# Patient Record
Sex: FEMALE | Race: AMERICAN INDIAN OR ALASKA NATIVE
[De-identification: names, ages, dates, MRNs, and addresses within clinical notes are randomized per-mention and may not be internally consistent; named-entity substitution may affect disease eponyms.]

---

## 2018-02-07 ENCOUNTER — HOSPITAL ENCOUNTER (OUTPATIENT)
Dept: HOSPITAL 5 - MRI | Age: 63
Discharge: HOME | End: 2018-02-07
Attending: PAIN MEDICINE
Payer: MEDICARE

## 2018-02-07 DIAGNOSIS — M51.46: ICD-10-CM

## 2018-02-07 DIAGNOSIS — M24.28: ICD-10-CM

## 2018-02-07 DIAGNOSIS — M48.07: Primary | ICD-10-CM

## 2018-02-07 PROCEDURE — 72148 MRI LUMBAR SPINE W/O DYE: CPT

## 2018-02-10 NOTE — MAGNETIC RESONANCE REPORT
MRI LUMBAR SPINE WITHOUT CONTRAST: 02/07/18



CLINICAL: Low back pain.





TECHNIQUE: Sagittal and axial T1 and T2, and sagittal STIR sequences a 

1.5 Krista magnet.  



FINDINGS: Normal vertebral body height, alignment and disc spaces.  

Overall marrow signal is normal.  The conus medullaris is normal and 

terminates at T12-L1.  There is pronounced fatty infiltration of the 

paraspinous muscles.



L1-2: Intact.



L2-3: Intact disc.  However, a large Schmorl's node of the superior 

endplate of L3.  Moderate ligament flavum hypertrophy and mild 

bilateral facet hypertrophy.



L3-4: Intact disc.  Moderate bilateral facet hypertrophy and ligamentum 

flavum hypertrophy.



L4-5: A focal central annular tear and a moderate size central focal 

disc protrusion.  Bilateral facet hypertrophy and ligamentum flavum 

hypertrophy contribute to moderate central spinal canal stenosis.  The 

AP diameter of the canal measures 6 mm.  Moderately severe bilateral 

neural foraminal stenosis.



L5-S1: Small broad-based central disc protrusion.  No canal stenosis.  

Bilateral facet hypertrophy and mild left neural foraminal stenosis.



IMPRESSION: 1.  Moderate size L4-5 focal central disc protrusion which 

contributes to moderate central spinal canal stenosis.  2.  Moderate to 

severe bilateral neural foraminal stenosis at L4-5.  3.  Small 

broad-based L4-5 central disc protrusion and mild left L5-S1 neural 

foraminal stenosis.

## 2021-05-23 ENCOUNTER — HOSPITAL ENCOUNTER (INPATIENT)
Dept: HOSPITAL 5 - ED | Age: 66
LOS: 6 days | Discharge: HOME HEALTH | DRG: 871 | End: 2021-05-29
Attending: INTERNAL MEDICINE | Admitting: INTERNAL MEDICINE
Payer: MEDICARE

## 2021-05-23 DIAGNOSIS — Z88.8: ICD-10-CM

## 2021-05-23 DIAGNOSIS — E11.42: ICD-10-CM

## 2021-05-23 DIAGNOSIS — J96.01: ICD-10-CM

## 2021-05-23 DIAGNOSIS — Z71.3: ICD-10-CM

## 2021-05-23 DIAGNOSIS — J18.9: ICD-10-CM

## 2021-05-23 DIAGNOSIS — I12.0: ICD-10-CM

## 2021-05-23 DIAGNOSIS — E87.5: ICD-10-CM

## 2021-05-23 DIAGNOSIS — R16.0: ICD-10-CM

## 2021-05-23 DIAGNOSIS — E11.51: ICD-10-CM

## 2021-05-23 DIAGNOSIS — I48.91: ICD-10-CM

## 2021-05-23 DIAGNOSIS — G92: ICD-10-CM

## 2021-05-23 DIAGNOSIS — Z79.4: ICD-10-CM

## 2021-05-23 DIAGNOSIS — Z99.2: ICD-10-CM

## 2021-05-23 DIAGNOSIS — E66.01: ICD-10-CM

## 2021-05-23 DIAGNOSIS — A41.89: Primary | ICD-10-CM

## 2021-05-23 DIAGNOSIS — Z20.822: ICD-10-CM

## 2021-05-23 DIAGNOSIS — N18.6: ICD-10-CM

## 2021-05-23 DIAGNOSIS — Z82.49: ICD-10-CM

## 2021-05-23 DIAGNOSIS — E11.22: ICD-10-CM

## 2021-05-23 LAB
ALBUMIN SERPL-MCNC: 4 G/DL (ref 3.9–5)
ALT SERPL-CCNC: 8 UNITS/L (ref 7–56)
APTT BLD: 33.7 SEC. (ref 24.2–36.6)
BASOPHILS # (AUTO): 0 K/MM3 (ref 0–0.1)
BASOPHILS NFR BLD AUTO: 0.4 % (ref 0–1.8)
BUN SERPL-MCNC: 49 MG/DL (ref 7–17)
BUN/CREAT SERPL: 6 %
CALCIUM SERPL-MCNC: 8.6 MG/DL (ref 8.4–10.2)
CRP SERPL-MCNC: 5 MG/DL (ref 0–1.3)
EOSINOPHIL # BLD AUTO: 0.2 K/MM3 (ref 0–0.4)
EOSINOPHIL NFR BLD AUTO: 1.6 % (ref 0–4.3)
HCT VFR BLD CALC: 37.9 % (ref 30.3–42.9)
HDLC SERPL-MCNC: 60 MG/DL (ref 40–59)
HEMOLYSIS INDEX: 3
HGB BLD-MCNC: 12.7 GM/DL (ref 10.1–14.3)
INR PPP: 1.32 (ref 0.87–1.13)
LYMPHOCYTES # BLD AUTO: 0.2 K/MM3 (ref 1.2–5.4)
LYMPHOCYTES NFR BLD AUTO: 1.8 % (ref 13.4–35)
MCHC RBC AUTO-ENTMCNC: 34 % (ref 30–34)
MCV RBC AUTO: 91 FL (ref 79–97)
MONOCYTES # (AUTO): 0.8 K/MM3 (ref 0–0.8)
MONOCYTES % (AUTO): 8.1 % (ref 0–7.3)
PLATELET # BLD: 119 K/MM3 (ref 140–440)
RBC # BLD AUTO: 4.15 M/MM3 (ref 3.65–5.03)

## 2021-05-23 PROCEDURE — 96375 TX/PRO/DX INJ NEW DRUG ADDON: CPT

## 2021-05-23 PROCEDURE — 84145 PROCALCITONIN (PCT): CPT

## 2021-05-23 PROCEDURE — U0003 INFECTIOUS AGENT DETECTION BY NUCLEIC ACID (DNA OR RNA); SEVERE ACUTE RESPIRATORY SYNDROME CORONAVIRUS 2 (SARS-COV-2) (CORONAVIRUS DISEASE [COVID-19]), AMPLIFIED PROBE TECHNIQUE, MAKING USE OF HIGH THROUGHPUT TECHNOLOGIES AS DESCRIBED BY CMS-2020-01-R: HCPCS

## 2021-05-23 PROCEDURE — 71250 CT THORAX DX C-: CPT

## 2021-05-23 PROCEDURE — 85379 FIBRIN DEGRADATION QUANT: CPT

## 2021-05-23 PROCEDURE — 96374 THER/PROPH/DIAG INJ IV PUSH: CPT

## 2021-05-23 PROCEDURE — 71045 X-RAY EXAM CHEST 1 VIEW: CPT

## 2021-05-23 PROCEDURE — 93005 ELECTROCARDIOGRAM TRACING: CPT

## 2021-05-23 PROCEDURE — G0480 DRUG TEST DEF 1-7 CLASSES: HCPCS

## 2021-05-23 PROCEDURE — 84100 ASSAY OF PHOSPHORUS: CPT

## 2021-05-23 PROCEDURE — 86140 C-REACTIVE PROTEIN: CPT

## 2021-05-23 PROCEDURE — 85730 THROMBOPLASTIN TIME PARTIAL: CPT

## 2021-05-23 PROCEDURE — 83615 LACTATE (LD) (LDH) ENZYME: CPT

## 2021-05-23 PROCEDURE — 84484 ASSAY OF TROPONIN QUANT: CPT

## 2021-05-23 PROCEDURE — 93306 TTE W/DOPPLER COMPLETE: CPT

## 2021-05-23 PROCEDURE — 86850 RBC ANTIBODY SCREEN: CPT

## 2021-05-23 PROCEDURE — 85025 COMPLETE CBC W/AUTO DIFF WBC: CPT

## 2021-05-23 PROCEDURE — 83735 ASSAY OF MAGNESIUM: CPT

## 2021-05-23 PROCEDURE — 85610 PROTHROMBIN TIME: CPT

## 2021-05-23 PROCEDURE — 80074 ACUTE HEPATITIS PANEL: CPT

## 2021-05-23 PROCEDURE — 82140 ASSAY OF AMMONIA: CPT

## 2021-05-23 PROCEDURE — 80061 LIPID PANEL: CPT

## 2021-05-23 PROCEDURE — 74176 CT ABD & PELVIS W/O CONTRAST: CPT

## 2021-05-23 PROCEDURE — 82728 ASSAY OF FERRITIN: CPT

## 2021-05-23 PROCEDURE — 70450 CT HEAD/BRAIN W/O DYE: CPT

## 2021-05-23 PROCEDURE — 84443 ASSAY THYROID STIM HORMONE: CPT

## 2021-05-23 PROCEDURE — 80053 COMPREHEN METABOLIC PANEL: CPT

## 2021-05-23 PROCEDURE — 80048 BASIC METABOLIC PNL TOTAL CA: CPT

## 2021-05-23 PROCEDURE — 5A1D70Z PERFORMANCE OF URINARY FILTRATION, INTERMITTENT, LESS THAN 6 HOURS PER DAY: ICD-10-PCS | Performed by: INTERNAL MEDICINE

## 2021-05-23 PROCEDURE — 74181 MRI ABDOMEN W/O CONTRAST: CPT

## 2021-05-23 PROCEDURE — 80320 DRUG SCREEN QUANTALCOHOLS: CPT

## 2021-05-23 PROCEDURE — 82962 GLUCOSE BLOOD TEST: CPT

## 2021-05-23 PROCEDURE — 36415 COLL VENOUS BLD VENIPUNCTURE: CPT

## 2021-05-23 PROCEDURE — 87040 BLOOD CULTURE FOR BACTERIA: CPT

## 2021-05-23 PROCEDURE — 86901 BLOOD TYPING SEROLOGIC RH(D): CPT

## 2021-05-23 PROCEDURE — 82550 ASSAY OF CK (CPK): CPT

## 2021-05-23 PROCEDURE — 80202 ASSAY OF VANCOMYCIN: CPT

## 2021-05-23 PROCEDURE — 86900 BLOOD TYPING SEROLOGIC ABO: CPT

## 2021-05-23 PROCEDURE — 83036 HEMOGLOBIN GLYCOSYLATED A1C: CPT

## 2021-05-23 NOTE — CAT SCAN REPORT
CT CHEST, ABDOMEN, AND PELVIS WITHOUT CONTRAST



INDICATION / CLINICAL INFORMATION:

sepsis, covid vs port infection vs other.



TECHNIQUE:

Axial CT images were obtained through the chest, abdomen, and pelvis without contrast. All CT scans a
t this location are performed using CT dose reduction for ALARA by means of automated exposure contro
l. 



COMPARISON:

None available.



FINDINGS:

HEART: No significant abnormality. 

THORACIC AORTA: No significant abnormality. 

MEDIASTINUM and ANNA: No significant abnormality.

LUNGS: There are mild patchy groundglass density changes throughout the lungs without focal airspace 
consolidation. No suspicious pulmonary nodule or mass is identified.

PLEURA: No significant pleural effusion. No pneumothorax.

ADDITIONAL CHEST FINDINGS: Left-sided port catheter is noted. No surrounding fluid collection. There 
are numerous prominent and enlarged supraclavicular lymph nodes bilaterally.



LIVER: There is a poorly defined hypoattenuating lesion within the right hepatic lobe near the dome. 
This measures approximately 6.6 x 5.4 cm.

GALLBLADDER: No significant abnormality.  

BILE DUCTS: No significant abnormality.

PANCREAS: No significant abnormality.

SPLEEN: No significant abnormality.

ADRENALS: No significant abnormality.

KIDNEYS/URETERS: Innumerable bilateral renal cysts are noted. There is no nephroureterolithiasis or o
bstructive uropathy.



STOMACH and SMALL BOWEL: No significant abnormality. 

COLON: No significant abnormality. 

APPENDIX: No significant abnormality.  

PERITONEUM: No free fluid. No free air. No fluid collection.

LYMPH NODES: No significant adenopathy.

AORTA and ARTERIES: Moderate atherosclerotic calcification without acute abnormality. 

IVC and VEINS: No significant abnormality.



URINARY BLADDER: No significant abnormality.

REPRODUCTIVE ORGANS: No significant abnormality.



ADDITIONAL FINDINGS: None.



SKELETAL SYSTEM: There is a rounded lytic lesion within the T12 vertebral body.



IMPRESSION:

1. Large hypoattenuating right hepatic lobe lesion is concerning for either metastatic disease or jason
mamadou neoplasm. Further evaluation with MRI or PET CT may be helpful for further evaluation.

2. Supraclavicular lymphadenopathy could also reflect sarah metastatic disease.

3. Polycystic kidneys.

4. Mild diffuse groundglass density changes throughout the lungs can be seen with atypical or viral i
nfectious processes.

5. The left-sided port catheter demonstrates no significant abnormality. There is no focal fluid cristian
ection/abscess.



Signer Name: Can Farias MD 

Signed: 5/23/2021 5:22 PM

Workstation Name: [id number]

## 2021-05-23 NOTE — CAT SCAN REPORT
CT CHEST, ABDOMEN, AND PELVIS WITHOUT CONTRAST



INDICATION / CLINICAL INFORMATION:

sepsis, covid vs port infection vs other.



TECHNIQUE:

Axial CT images were obtained through the chest, abdomen, and pelvis without contrast. All CT scans a
t this location are performed using CT dose reduction for ALARA by means of automated exposure contro
l. 



COMPARISON:

None available.



FINDINGS:

HEART: No significant abnormality. 

THORACIC AORTA: No significant abnormality. 

MEDIASTINUM and ANNA: No significant abnormality.

LUNGS: There are mild patchy groundglass density changes throughout the lungs without focal airspace 
consolidation. No suspicious pulmonary nodule or mass is identified.

PLEURA: No significant pleural effusion. No pneumothorax.

ADDITIONAL CHEST FINDINGS: Left-sided port catheter is noted. No surrounding fluid collection. There 
are numerous prominent and enlarged supraclavicular lymph nodes bilaterally.



LIVER: There is a poorly defined hypoattenuating lesion within the right hepatic lobe near the dome. 
This measures approximately 6.6 x 5.4 cm.

GALLBLADDER: No significant abnormality.  

BILE DUCTS: No significant abnormality.

PANCREAS: No significant abnormality.

SPLEEN: No significant abnormality.

ADRENALS: No significant abnormality.

KIDNEYS/URETERS: Innumerable bilateral renal cysts are noted. There is no nephroureterolithiasis or o
bstructive uropathy.



STOMACH and SMALL BOWEL: No significant abnormality. 

COLON: No significant abnormality. 

APPENDIX: No significant abnormality.  

PERITONEUM: No free fluid. No free air. No fluid collection.

LYMPH NODES: No significant adenopathy.

AORTA and ARTERIES: Moderate atherosclerotic calcification without acute abnormality. 

IVC and VEINS: No significant abnormality.



URINARY BLADDER: No significant abnormality.

REPRODUCTIVE ORGANS: No significant abnormality.



ADDITIONAL FINDINGS: None.



SKELETAL SYSTEM: There is a rounded lytic lesion within the T12 vertebral body.



IMPRESSION:

1. Large hypoattenuating right hepatic lobe lesion is concerning for either metastatic disease or jason
mamadou neoplasm. Further evaluation with MRI or PET CT may be helpful for further evaluation.

2. Supraclavicular lymphadenopathy could also reflect sarah metastatic disease.

3. Polycystic kidneys.

4. Mild diffuse groundglass density changes throughout the lungs can be seen with atypical or viral i
nfectious processes.

5. The left-sided port catheter demonstrates no significant abnormality. There is no focal fluid cristian
ection/abscess.



Signer Name: Can Farias MD 

Signed: 5/23/2021 5:22 PM

Workstation Name: [id number]

## 2021-05-23 NOTE — XRAY REPORT
CHEST 1 VIEW 5/23/2021 5:06 PM



INDICATION / CLINICAL INFORMATION: Altered Mental Status.



COMPARISON: None available.



FINDINGS:



SUPPORT DEVICES: Left-sided Port-A-Cath.

HEART / MEDIASTINUM: Borderline cardiomegaly. 

LUNGS / PLEURA: Central vascular congestion and mild interstitial prominence bilaterally. No pneumoth
orax. 



ADDITIONAL FINDINGS: No significant additional findings.



IMPRESSION:

1. Borderline cardiomegaly with central vascular congestion with mild interstitial prominence. This c
an be seen in the setting of congestive failure/pulmonary edema.



Signer Name: Can Farias MD 

Signed: 5/23/2021 6:17 PM

Workstation Name: [id number]

## 2021-05-23 NOTE — HISTORY AND PHYSICAL REPORT
History of Present Illness


Date of examination: 21


Date of admission: 


21 21:43





Chief complaint: 





Altered sensorium since last night


History of present illness: 


65-year-old female with history of hypertension, end-stage renal disease, 

insulin-dependent diabetes brought in by EMS for altered sensorium since last 

night. As per family patient has been lethargic and also having low-grade fever.

Patient go for dialysis on a regular basis. In the emergency room patient was 

lethargic and but easily arousable. Patient also had a low-grade fever in the 

emergency room. Temperature 100.3. In the emergency room patient was found to be

having a high potassium of 6.3 which was treated in the emergency room. Patient 

had CAT scans of the chest and abdomen which showed groundglass opacity and also

had hepatic mass. Patient being admitted for further evaluation and rule out 

Covid plus bilateral pneumonia and treatment of hyperkalemia.


Patient is a full code.

















Past History


Past Medical History: CAD, diabetes, ESRD, hypertension, other (Peripheral 

neuropathy)


Past Surgical History: Other (AV fistula" on the left side)


Social history: lives with family, full code


Family history: hypertension





Medications and Allergies


                                    Allergies











Allergy/AdvReac Type Severity Reaction Status Date / Time


 


sulfamethoxazole Allergy  Shortness Unverified 10/15/14 05:48





[From Bactrim]   of  





   Breath/HIVES  


 


trimethoprim [From Bactrim] Allergy  Shortness Unverified 10/15/14 05:48





   of  





   Breath/HIVES  











                                Home Medications











 Medication  Instructions  Recorded  Confirmed  Last Taken  Type


 


Apixaban [Eliquis] 5 mg PO BID 21 Unknown History


 


Cholecalciferol (Vitamin D3) 2,000 unit PO QDAY 21 Unknown 

History





[Vitamin D3 2,000 UNIT CAP]     


 


Cinacalcet HCl [Sensipar] 30 mg PO DAILY 21 Unknown History


 


Gabapentin [Neurontin] 600 mg PO Q8H 21 Unknown History


 


Insulin Aspart (Nf) [NovoLOG 1 unit SQ DAILY 21 Unknown History





Flexpen]     


 


Metoprolol [Lopressor] 25 mg PO DAILY 21 Unknown History


 


Pantoprazole Sodium [Protonix] 40 mg PO DAILY 21 Unknown History


 


Pregabalin [Lyrica] 75 mg PO BID 21 Unknown History


 


Sevelamer Carbonate [Renvela] 800 mg PO TIDWM PRN 21 Unknown 

History


 


Tadalafil [Cialis] 20 mg PO BID 21 Unknown History


 


Tramadol HCl [traMADol  MG] 1 tab PO DAILY 21 Unknown H

istory


 


dilTIAZem HCl [Diltiazem 24Hr  mg PO DAILY 21 Unknown 

History





(Cd)]     











Active Meds: 


Active Medications





Acetaminophen (Acetaminophen 325 Mg Tab)  650 mg PO Q4H PRN


   PRN Reason: Pain MILD(1-3)/Fever >100.5/HA


Acetaminophen (Acetaminophen 325 Mg Tab)  650 mg PO Q4H PRN


   PRN Reason: Pain MILD(1-3)/Fever >100.5/HA


Apixaban (Apixaban 5 Mg Tab)  5 mg PO BID JUDY


Cholecalciferol (Cholecalciferol (Vit D3) 1000 Unit (25 Mcg) Tab)  2,000 unit PO

QDAY JUDY


Cinacalcet (Cinacalcet 30 Mg Tab)  30 mg PO DAILY JUDY


Dextrose (Dextrose 50% In Water (25gm) 50 Ml Vial)  50 gm IV Q30MIN PRN; 

Protocol


   PRN Reason: Hypoglycemia


   Last Admin: 21 19:30 Dose:  50 gm


   Documented by: 


Diltiazem HCl (Diltiazem Cd 180 Mg Cap)  180 mg PO DAILY Replaced by [facility]


Gabapentin (Gabapentin 300 Mg Cap)  600 mg PO Q8HR JUDY


Cefepime HCl (Cefepime/Ns 1 Gm/100 Ml)  1 gm in 100 mls @ 200 mls/hr IV Q8H JUDY;

Protocol


Calcium Gluconate 2,000 mg/ (Sodium Chloride)  120 mls @ 660 mls/hr IV ONCE ONE


   Stop: 21 22:49


Insulin Human Lispro (Insulin Lispro 100 Unit/Ml)  0 unit SUB-Q ACHS JUDY; 

Protocol


Metoclopramide HCl (Metoclopramide 10 Mg/2 Ml Inj)  10 mg IV Q6H PRN


   PRN Reason: Nausea And Vomiting


Metoprolol Tartrate (Metoprolol Tartrate 25 Mg Tab)  25 mg PO DAILY Replaced by [facility]


Miscellaneous Medication (Tadalafil [Cialis])  20 mg PO BID Replaced by [facility]


Morphine Sulfate (Morphine 2 Mg/1 Ml Inj)  2 mg IV Q4H PRN


   PRN Reason: Pain, Moderate (4-6)


Ondansetron HCl (Ondansetron 4 Mg/2 Ml Inj)  4 mg IV Q8H PRN


   PRN Reason: Nausea And Vomiting


Oxycodone/Acetaminophen (Oxycodone /Acetaminophen 5-325mg Tab)  1 tab PO Q6H PRN


   PRN Reason: Pain, Moderate (4-6)


Pantoprazole Sodium (Pantoprazole 40 Mg Tab)  40 mg PO DAILY Replaced by [facility]


Pregabalin (Pregabalin 75 Mg Cap)  75 mg PO BID Replaced by [facility]


Sevelamer Carbonate (Sevelamer Carbonate 800 Mg Tab)  800 mg PO TIDWM PRN


   PRN Reason: Pain , Severe (7-10)


Sodium Chloride (Sodium Chloride 0.9% 10 Ml Flush Syringe)  10 ml IV BID JUDY


Sodium Chloride (Sodium Chloride 0.9% 10 Ml Flush Syringe)  10 ml IV PRN PRN


   PRN Reason: LINE FLUSH











Review of Systems


All systems: negative


Constitutional: fever, no weight loss, no weight gain


Ears, nose, mouth and throat: no ear pain, no ear discharge, no tinnitis


Breasts: deferred


Cardiovascular: no chest pain


Respiratory: cough, shortness of breath, dyspnea on exertion, no cough with 

sputum, no excessive sputum


Gastrointestinal: no abdominal pain, no nausea, no vomiting, no diarrhea


Genitourinary Female: no dysuria, no urinary frequency, no urgency


Menstruation: ammenorrhea


Musculoskeletal: no neck stiffness, no neck pain, no shooting arm pain, no arm 

numbness/tingling


Integumentary: no rash, no pruritis, no redness, no sores


Neurological: no transient paralysis, no paralysis, no weakness, no seizures, no

 syncope


Psychiatric: anxiety


Endocrine: no cold intolerance, no heat intolerance, no polyphagia


Hematologic/Lymphatic: no easy bruising, no easy bleeding


Allergic/Immunologic: no urticaria, no allergic rhinitis, no wheezing





Exam





- Constitutional


Vitals: 


                                        











Temp Pulse Resp BP Pulse Ox


 


 99.2 F   55 L  16   112/61   100 


 


 21 20:35  21 21:15  21 20:35  21 21:15  21 20:35











General appearance: Present: no acute distress, well-nourished





- EENT


Eyes: Present: PERRL


ENT: hearing intact, clear oral mucosa





- Neck


Neck: Present: supple, normal ROM





- Respiratory


Respiratory effort: normal


Respiratory: bilateral: CTA





- Cardiovascular


Heart rate: 88


Rhythm: regular


Heart Sounds: Present: S1 & S2.  Absent: rub, click





- Extremities


Extremities: pulses symmetrical, No edema


Peripheral Pulses: within normal limits





- Abdominal


General gastrointestinal: Present: soft, non-tender, non-distended, normal bowel

 sounds


Female genitourinary: Present: normal





- Rectal


Rectal Exam: deferred





- Integumentary


Integumentary: Present: clear, warm, dry





- Musculoskeletal


Musculoskeletal: generalized weakness





- Psychiatric


Psychiatric: appropriate mood/affect, other (Altered sensorium)





- Neurologic


Neurologic: CNII-XII intact, moves all extremities





HEART Score





- HEART Score


History: Slightly suspicious


Age: 45-65


Risk factors: > 3 risk factors or hx of atherosclerotic disease


Troponin: 


                                        











Troponin T  0.088 ng/mL (0.00-0.029)  H  21  17:12    














- Critical Actions


Critical Actions: 4-6 pts:12-16.6% risk of adverse cardiac event. Should be 

admitted





Results





- Labs


CBC & Chem 7: 


                                 21 17:12





                                 21 17:12


Labs: 


                             Laboratory Last Values











WBC  10.1 K/mm3 (4.5-11.0)   21  17:12    


 


RBC  4.15 M/mm3 (3.65-5.03)   21  17:12    


 


Hgb  12.7 gm/dl (10.1-14.3)   21  17:12    


 


Hct  37.9 % (30.3-42.9)   21  17:12    


 


MCV  91 fl (79-97)   21  17:12    


 


MCH  31 pg (28-32)   21  17:12    


 


MCHC  34 % (30-34)   21  17:12    


 


RDW  17.9 % (13.2-15.2)  H  21  17:12    


 


Plt Count  119 K/mm3 (140-440)  L  21  17:12    


 


Lymph % (Auto)  1.8 % (13.4-35.0)  L  21  17:12    


 


Mono % (Auto)  8.1 % (0.0-7.3)  H  21  17:12    


 


Eos % (Auto)  1.6 % (0.0-4.3)   21  17:12    


 


Baso % (Auto)  0.4 % (0.0-1.8)   21  17:12    


 


Lymph # (Auto)  0.2 K/mm3 (1.2-5.4)  L  21  17:12    


 


Mono # (Auto)  0.8 K/mm3 (0.0-0.8)   21  17:12    


 


Eos # (Auto)  0.2 K/mm3 (0.0-0.4)   21  17:12    


 


Baso # (Auto)  0.0 K/mm3 (0.0-0.1)   21  17:12    


 


Seg Neutrophils %  88.1 % (40.0-70.0)  H  21  17:12    


 


Seg Neutrophils #  8.9 K/mm3 (1.8-7.7)  H  21  17:12    


 


PT  16.2 Sec. (12.2-14.9)  H  21  17:12    


 


INR  1.32  (0.87-1.13)  H  21  17:12    


 


APTT  33.7 Sec. (24.2-36.6)   21  17:12    


 


D-Dimer  408.28 ng/mlDDU (0-234)  H  21  17:12    


 


Sodium  134 mmol/L (137-145)  L  21  17:12    


 


Potassium  6.4 mmol/L (3.6-5.0)  H*  21  17:12    


 


Chloride  88.3 mmol/L ()  L  21  17:12    


 


Carbon Dioxide  31 mmol/L (22-30)  H  21  17:12    


 


Anion Gap  21 mmol/L  21  17:12    


 


BUN  49 mg/dL (7-17)  H  21  17:12    


 


Creatinine  8.0 mg/dL (0.6-1.2)  H  21  17:12    


 


Estimated GFR  6 ml/min  21  17:12    


 


BUN/Creatinine Ratio  6 %  21  17:12    


 


Glucose  257 mg/dL ()  H  21  17:12    


 


Lactic Acid  1.70 mmol/L (0.7-2.0)   21  17:12    


 


Calcium  8.6 mg/dL (8.4-10.2)   21  17:12    


 


Magnesium  2.00 mg/dL (1.7-2.3)   21  17:12    


 


Ferritin  203.6 ng/mL (10.0-200.0)  H  21  17:12    


 


Total Bilirubin  0.80 mg/dL (0.1-1.2)   21  17:12    


 


AST  10 units/L (5-40)   21  17:12    


 


ALT  8 units/L (7-56)   21  17:12    


 


Alkaline Phosphatase  155 units/L ()  H  21  17:12    


 


Ammonia  20.0 umol/L (25-60)  L  21  17:12    


 


Lactate Dehydrogenase  219 units/L ()  H  21  17:12    


 


Total Creatine Kinase  57 units/L ()   21  17:12    


 


Troponin T  0.088 ng/mL (0.00-0.029)  H  21  17:12    


 


C-Reactive Protein  5.00 mg/dL (0.00-1.30)  H  21  17:12    


 


Total Protein  7.2 g/dL (6.3-8.2)   21  17:12    


 


Albumin  4.0 g/dL (3.9-5)   21  17:12    


 


Albumin/Globulin Ratio  1.3 %  21  17:12    


 


Triglycerides  104 mg/dL (2-149)   21  17:12    


 


Cholesterol  158 mg/dL ()   21  17:12    


 


LDL Cholesterol Direct  90 mg/dL ()   21  17:12    


 


HDL Cholesterol  60 mg/dL (40-59)  H  21  17:12    


 


Cholesterol/HDL Ratio  2.63 %  21  17:12    


 


TSH  0.696 mlU/mL (0.270-4.200)   21  17:12    


 


Salicylates  < 0.3 mg/dL (2.8-20.0)  L  21  17:12    


 


Acetaminophen  5.0 ug/mL (10.0-30.0)  L  21  17:12    


 


Plasma/Serum Alcohol  < 0.01 % (0-0.07)   21  17:12    


 


Hepatitis A IgM Ab  Non-reactive  (NonReactive)   21  20:52    


 


Hep Bs Antigen  Non-reactive  (Negative)   21  20:52    


 


Hep B Core IgM Ab  Non-reactive  (NonReactive)   21  20:52    


 


Hepatitis C Antibody 


 Non-reactive  (NonReactive)   21  20:52    


 


Blood Type  O POSITIVE   21  17:12    


 


Antibody Screen  Negative   21  17:12    








                                        





Authorization to Release Body    


Next of Kin Authorizing the      


Release of the Body              


 Home                     


 Home Address             


 Home Phone #             








Microbiology: 


Microbiology





21 17:21   Peripheral/Venous   Blood Culture - Preliminary


                            Culture in Progress


21 16:58   Peripheral/Venous   Blood Culture - Preliminary


                            Culture in Progress











- Imaging and Cardiology


EKG: report reviewed


CT scan - abdomen: report reviewed


CT scan - chest: report reviewed


Imaging and Cardiology: 





Chest x-ray


Borderline cardiomegaly with central vascular congestion with mild interstitial 

prominence


This can be seen in the setting of congestive failure/pulmonary edema








Abdominal CAT scan


Large hypoattenuating right hepatic lobe lesion concerning for either metastatic

 disease or primary neoplasm


Further evaluation with MRI of PET/CT may be helpful for further evaluation


Supraclavicular lymphadenopathy could also reflect sarah metastatic disease


Polycystic kidneys


Mild diffuse groundglass density changes throughout the lungs can be seen with 

atypical or viral infections


Left-sided port catheter demonstrating no significant abnormality there is no 

focal fluid collection or abscess.











Chest CT


Large hypoattenuating right hepatic lobe lobe lesion concerning for either 

metastatic disease or primary neoplasm


Further evaluation with MRI PET scan may be helpful for further evaluation


Supraclavicular lymphadenopathy


Polycystic kidneys


Mild diffuse groundglass density changes throughout the lungs can be seen in 

with atypical or viral infectious process


Left-sided port catheter with no significant abnormality.





Assessment and Plan


Advance Directives: Yes (Full code)


VTE prophylaxis?: Chemical


Plan of care discussed with patient/family: Yes





- Patient Problems


(1) Acute encephalopathy


Current Visit: Yes   Status: Acute   


Plan to address problem: 


Secondary to uremia and possible bacteremia








(2) Liver mass, right lobe


Current Visit: Yes   Status: Acute   


Plan to address problem: 


Hepatic mass on CAT scan of the abdomen and CAT scan of the lung


MRI of the abdomen recommended


MRI ordered


If necessary PET scan but not at this point








(3) ESRD (end stage renal disease) on dialysis


Current Visit: Yes   Status: Chronic   


Plan to address problem: 


Nephrology consulted for emergent hemodialysis








(4) Hyperkalemia


Current Visit: Yes   Status: Acute   


Plan to address problem: 


Hyperkalemia treated in the emergency room


Hyperkalemia cocktail given in the emergency room


Patient may go for emergent hemodialysis








(5) Bilateral pneumonia


Current Visit: Yes   Status: Acute   


Plan to address problem: 


Treat as community-acquired pneumonia








(6) Suspected 2019 novel coronavirus infection


Current Visit: Yes   Status: Acute   


Plan to address problem: 


Coronavirus PCR i test n the morning








(7) IDDM (insulin dependent diabetes mellitus)


Current Visit: Yes   Status: Chronic   


Plan to address problem: 


Continue home insulin and coverage








(8) DVT prophylaxis


Current Visit: Yes   Status: Acute   


Plan to address problem: 


On heparin and GI prophylaxis

## 2021-05-23 NOTE — CAT SCAN REPORT
CT head/brain wo con



INDICATION:

ams, fever.



TECHNIQUE: Routine CT head. All CT scans at this location are performed using CT dose reduction for A
PEBBLES by means of automated exposure control.



COMPARISON: 

None.



FINDINGS:



Intracranial: Gray-white matter differentiation is maintained. No intracranial hemorrhage. No extra a
xial collection. No hydrocephalus. No herniation. Periventricular and centrum semiovale white matter 
hypoattenuation most consistent with sequela of chronic microvascular disease.

Sinuses: Paranasal sinuses and mastoid air cells are essentially clear.

Orbits: Proptosis. Globes are intact. 

Calvarium: No acute fracture.





IMPRESSION:

1.  No acute intracranial abnormality.



Signer Name: Herman Uribe MD 

Signed: 5/23/2021 5:17 PM

Workstation Name: [id number]

## 2021-05-23 NOTE — EMERGENCY DEPARTMENT REPORT
ED General Adult HPI





- General


Chief complaint: Altered Mental Status


Stated complaint: AMS


PUI?: Yes


Time Seen by Provider: 21 16:04


Source: EMS ( EMS documentation not available at time of chart dictation ), RN 

notes reviewed


Mode of arrival: Stretcher


Limitations: Altered Mental Status, Physical Limitation





- History of Present Illness


Initial comments: 





The patient was evaluated in the emergency department for symptoms described in 

the history of present illness.  He/she was evaluated in the context of the 

global COVID-19 pandemic, which necessitated consideration that the patient 

might be at risk for infection with the virus that causes COVID-19.  

Institutional protocols and algorithms that pertain to the evaluation of 

patients at risk for COVID-19 are in a state of rapid change based on 

information released by regulatory bodies including the CDC and federal and 

state organizations.  These policies and algorithms were followed during the 

patient's care in the emergency department.  Please note that these policies, 

procedures and recommendations changed on a rapid basis.





During the history and physical examination, I had on complete personal 

protective equipment.





This is a 65-year-old female.  She is not known to myself previously.  Her past 

medical history includes obesity, hypertension, diabetes, Eliquis on A. fib, 

end-stage renal disease on hemodialysis, Monday, Wednesday, Friday.





Nephrology: Dr. MESERET Abel


History entirely obtained from nursing team, who in turn received report from 

emergency medical services.  The patient is not currently accompanied by friends

or family at this time for collateral information or additional information.  

The patient is reportedly brought to the hospital by emergency medical services 

for complaint of altered mental status.  As per nursing report, last known well 

time was 10:00 PM last night.  Apparently, family contacted 911 because the 

patient was in bed, sleepy, poorly arousable, and family felt that this was not 

her baseline.





Patient herself is altered, states "do not touch me."  The patient is not able 

to describe the qualitative nature of symptoms, exacerbating factors, relieving 

factors, or aggravating factors.





Patient found to have O2 sat of 91, 90% on room air in the emergency room, and 

is found to have a temperature of 102.3 degrees rectally.








-: unknown





- Related Data


                                Home Medications











 Medication  Instructions  Recorded  Confirmed  Last Taken


 


Apixaban [Eliquis] 5 mg PO BID 21 Unknown


 


Cholecalciferol (Vitamin D3) 2,000 unit PO QDAY 21 Unknown





[Vitamin D3 2,000 UNIT CAP]    


 


Cinacalcet HCl [Sensipar] 30 mg PO DAILY 21 Unknown


 


Gabapentin [Neurontin] 600 mg PO Q8H 21 Unknown


 


Insulin Aspart (Nf) [NovoLOG 1 unit SQ DAILY 21 Unknown





Flexpen]    


 


Metoprolol [Lopressor] 25 mg PO DAILY 21 Unknown


 


Pantoprazole Sodium [Protonix] 40 mg PO DAILY 21 Unknown


 


Pregabalin [Lyrica] 75 mg PO BID 21 Unknown


 


Sevelamer Carbonate [Renvela] 800 mg PO TIDWM PRN 21 Unknown


 


Tadalafil [Cialis] 20 mg PO BID 21 Unknown


 


Tramadol HCl [traMADol  MG] 1 tab PO DAILY 21 Unknown


 


dilTIAZem HCl [Diltiazem 24Hr  mg PO DAILY 21 Unknown





(Cd)]    











                                    Allergies











Allergy/AdvReac Type Severity Reaction Status Date / Time


 


sulfamethoxazole Allergy  Shortness Unverified 10/15/14 05:48





[From Bactrim]   of  





   Breath/HIVES  


 


trimethoprim [From Bactrim] Allergy  Shortness Unverified 10/15/14 05:48





   of  





   Breath/HIVES  














ED Review of Systems


ROS: 


Stated complaint: AMS


Other details as noted in HPI





Comment: Unobtainable due to pts medical conditions





ED Past Medical Hx





- Medications


Home Medications: 


                                Home Medications











 Medication  Instructions  Recorded  Confirmed  Last Taken  Type


 


Apixaban [Eliquis] 5 mg PO BID 21 Unknown History


 


Cholecalciferol (Vitamin D3) 2,000 unit PO QDAY 21 Unknown 

History





[Vitamin D3 2,000 UNIT CAP]     


 


Cinacalcet HCl [Sensipar] 30 mg PO DAILY 21 Unknown History


 


Gabapentin [Neurontin] 600 mg PO Q8H 21 Unknown History


 


Insulin Aspart (Nf) [NovoLOG 1 unit SQ DAILY 21 Unknown History





Flexpen]     


 


Metoprolol [Lopressor] 25 mg PO DAILY 21 Unknown History


 


Pantoprazole Sodium [Protonix] 40 mg PO DAILY 21 Unknown History


 


Pregabalin [Lyrica] 75 mg PO BID 21 Unknown History


 


Sevelamer Carbonate [Renvela] 800 mg PO TIDWM PRN 21 Unknown 

History


 


Tadalafil [Cialis] 20 mg PO BID 21 Unknown History


 


Tramadol HCl [traMADol  MG] 1 tab PO DAILY 21 Unknown 

History


 


dilTIAZem HCl [Diltiazem 24Hr  mg PO DAILY 21 Unknown 

History





(Cd)]     














ED Physical Exam





- General


Limitations: Altered Mental Status, Physical Limitation


General appearance: lethargic





- Head


Head exam: Present: atraumatic, normocephalic





- Eye


Eye exam: Present: normal appearance, EOMI





- ENT


ENT exam: Present: normal exam, mucous membranes moist, normal external ear exam





- Neck


Neck exam: Present: normal inspection, full ROM.  Absent: tenderness, meni

ngismus





- Respiratory


Respiratory exam: Present: normal lung sounds bilaterally, other (There is a 

left-sided thoracic wall port noted, without redness, pus or streaking).  

Absent: respiratory distress, wheezes, rales, rhonchi, stridor, chest wall 

tenderness





- Cardiovascular


Cardiovascular Exam: Present: regular rate, normal rhythm, normal heart sounds. 

Absent: bradycardia, tachycardia, irregular rhythm, systolic murmur, diastolic 

murmur, rubs, gallop





- GI/Abdominal


GI/Abdominal exam: Present: soft.  Absent: distended, tenderness, guarding, 

rebound, rigid, pulsatile mass





- Rectal


Rectal exam: Present: normal inspection, other (There is no obvious blood on 

external rectal examination.  Chaperoned by chelsie Loredo student)





- Extremities Exam


Extremities exam: Present: normal inspection (There is a right upper extremity 

graft, without redness, pus or streaking), other (2+ pulses noted in the 

bilateral upper and lower extremities.  There is no palpable cord.   negative 

Homans sign.  Muscular compartments are soft.  The pelvis is stable.)





- Back Exam


Back exam: Present: normal inspection.  Absent: tenderness, CVA tenderness (R), 

CVA tenderness (L), paraspinal tenderness, vertebral tenderness





- Neurological Exam


Neurological exam: Present: altered (The patient states "do not touch me."), 

other (The patient is sleepy.  The patient is breathing spontaneously.  The 

patient moves 4 extremities in response to painful stimuli, and localizes pain.)





- Skin


Skin exam: Present: warm, dry, intact, normal color.  Absent: rash





ED Course


                                   Vital Signs











  21





  16:11 16:16


 


Temperature 102.3 F H 


 


Pulse Rate 62 63


 


Respiratory 19 17





Rate  


 


Blood Pressure 141/64 141/64


 


O2 Sat by Pulse 95 94





Oximetry  














- Reevaluation(s)


Reevaluation #1: 





21 16:39


Differential diagnosis, including but not limited to: Bacteremia, viremia, 

pneumonia, urinary tract infection, COVID-19, meningitis, encephalitis, port 

infection, thoracic infection, intra-abdominal infection





Assessment and plan: 65-year-old female with fever and altered mental status who

systemically anticoagulated on Eliquis and morbidly obese.





Place patient on isolation precautions.  Start steroids, antibiotics 

empirically.  Obtain CT scan of the brain, chest, abdomen and pelvis.  Obtain 

appropriate laboratory studies, blood cultures, lactic acid.  Patient is not 

hypotensive.





We will discuss with her covering nephrologist once initial diagnostics have 

resulted.  Unable to perform a spinal tap given presence of anticoagulation.  At

the moment, patient is breathing spontaneously, and protecting her airway, and 

does move 4 extremities.  Patient meets criteria for admission to this hospital 

for fever, altered mental status.





Reassess after initial data points have resulted








Please courtesy consultation for infectious disease to follow on the inpatient 

side of things, defer to inpatient team to follow-up their recommendations.


Reevaluation #2: 





21 18:01


Laboratory studies reviewed and appreciated.  Hyperkalemia is suggested.  

Hyperkalemia cocktail will be ordered for potassium of 6.4.





We have placed a page to covering nephrology.





Contacted hospital physician, Dr. JUNITO Badillo  We discussed the patient's history, 

physical, laboratory studies, imaging studies and my overall clinical 

impression, he has graciously accepted the patient to the internal medicine ser

vice for the aforementioned.


Reevaluation #3: 





21 18:02


Elevated troponin is likely a type II troponin leak.





- Consultations


Consultation #1: 





21 18:06


Contacted nephrology on-call, Dr. Hernández.  We discussed the patient's history, 

physical, imaging studies and pertinent laboratory studies.  His group will 

follow in consultation.  He agrees with medical management for hyperkalemia, and

indicates he will place hemodialysis orders.  He request that we communicated to

on-call dialysis nurse to please come into the emergency room, for emergent 

hemodialysis for hyperkalemia.





ED Medical Decision Making





- Lab Data


Result diagrams: 


                                 21 17:12





                                 21 17:12








                                   Vital Signs











  21





  16:11


 


Temperature 102.3 F H


 


Pulse Rate 62


 


Respiratory 19





Rate 


 


Blood Pressure 141/64


 


O2 Sat by Pulse 95





Oximetry 











                                   Lab Results











  21 Range/Units





  17:12 17:12 17:12 


 


WBC  10.1    (4.5-11.0)  K/mm3


 


RBC  4.15    (3.65-5.03)  M/mm3


 


Hgb  12.7    (10.1-14.3)  gm/dl


 


Hct  37.9    (30.3-42.9)  %


 


MCV  91    (79-97)  fl


 


MCH  31    (28-32)  pg


 


MCHC  34    (30-34)  %


 


RDW  17.9 H    (13.2-15.2)  %


 


Plt Count  119 L    (140-440)  K/mm3


 


Lymph % (Auto)  1.8 L    (13.4-35.0)  %


 


Mono % (Auto)  8.1 H    (0.0-7.3)  %


 


Eos % (Auto)  1.6    (0.0-4.3)  %


 


Baso % (Auto)  0.4    (0.0-1.8)  %


 


Lymph # (Auto)  0.2 L    (1.2-5.4)  K/mm3


 


Mono # (Auto)  0.8    (0.0-0.8)  K/mm3


 


Eos # (Auto)  0.2    (0.0-0.4)  K/mm3


 


Baso # (Auto)  0.0    (0.0-0.1)  K/mm3


 


Seg Neutrophils %  88.1 H    (40.0-70.0)  %


 


Seg Neutrophils #  8.9 H    (1.8-7.7)  K/mm3


 


PT   16.2 H   (12.2-14.9)  Sec.


 


INR   1.32 H   (0.87-1.13)  


 


APTT   33.7   (24.2-36.6)  Sec.


 


D-Dimer   408.28 H   (0-234)  ng/mlDDU


 


Sodium    134 L  (137-145)  mmol/L


 


Chloride    88.3 L  ()  mmol/L


 


Carbon Dioxide    31 H  (22-30)  mmol/L


 


Anion Gap    21  mmol/L


 


BUN    49 H  (7-17)  mg/dL


 


Creatinine    8.0 H  (0.6-1.2)  mg/dL


 


Estimated GFR    6  ml/min


 


BUN/Creatinine Ratio    6  %


 


Glucose    257 H  ()  mg/dL


 


Lactic Acid     (0.7-2.0)  mmol/L


 


Calcium    8.6  (8.4-10.2)  mg/dL


 


Magnesium     (1.7-2.3)  mg/dL


 


Total Bilirubin    0.80  (0.1-1.2)  mg/dL


 


AST    10  (5-40)  units/L


 


ALT    8  (7-56)  units/L


 


Alkaline Phosphatase    155 H  ()  units/L


 


Ammonia     (25-60)  umol/L


 


Lactate Dehydrogenase    219 H  ()  units/L


 


Total Creatine Kinase     ()  units/L


 


Troponin T    0.088 H  (0.00-0.029)  ng/mL


 


C-Reactive Protein    5.00 H  (0.00-1.30)  mg/dL


 


Total Protein    7.2  (6.3-8.2)  g/dL


 


Albumin    4.0  (3.9-5)  g/dL


 


Albumin/Globulin Ratio    1.3  %


 


Salicylates     (2.8-20.0)  mg/dL


 


Acetaminophen     (10.0-30.0)  ug/mL


 


Plasma/Serum Alcohol     (0-0.07)  %














  21 Range/Units





  17:12 17:12 17:12 


 


WBC     (4.5-11.0)  K/mm3


 


RBC     (3.65-5.03)  M/mm3


 


Hgb     (10.1-14.3)  gm/dl


 


Hct     (30.3-42.9)  %


 


MCV     (79-97)  fl


 


MCH     (28-32)  pg


 


MCHC     (30-34)  %


 


RDW     (13.2-15.2)  %


 


Plt Count     (140-440)  K/mm3


 


Lymph % (Auto)     (13.4-35.0)  %


 


Mono % (Auto)     (0.0-7.3)  %


 


Eos % (Auto)     (0.0-4.3)  %


 


Baso % (Auto)     (0.0-1.8)  %


 


Lymph # (Auto)     (1.2-5.4)  K/mm3


 


Mono # (Auto)     (0.0-0.8)  K/mm3


 


Eos # (Auto)     (0.0-0.4)  K/mm3


 


Baso # (Auto)     (0.0-0.1)  K/mm3


 


Seg Neutrophils %     (40.0-70.0)  %


 


Seg Neutrophils #     (1.8-7.7)  K/mm3


 


PT     (12.2-14.9)  Sec.


 


INR     (0.87-1.13)  


 


APTT     (24.2-36.6)  Sec.


 


D-Dimer     (0-234)  ng/mlDDU


 


Sodium     (137-145)  mmol/L


 


Chloride     ()  mmol/L


 


Carbon Dioxide     (22-30)  mmol/L


 


Anion Gap     mmol/L


 


BUN     (7-17)  mg/dL


 


Creatinine     (0.6-1.2)  mg/dL


 


Estimated GFR     ml/min


 


BUN/Creatinine Ratio     %


 


Glucose     ()  mg/dL


 


Lactic Acid  1.70    (0.7-2.0)  mmol/L


 


Calcium     (8.4-10.2)  mg/dL


 


Magnesium     (1.7-2.3)  mg/dL


 


Total Bilirubin     (0.1-1.2)  mg/dL


 


AST     (5-40)  units/L


 


ALT     (7-56)  units/L


 


Alkaline Phosphatase     ()  units/L


 


Ammonia     (25-60)  umol/L


 


Lactate Dehydrogenase     ()  units/L


 


Total Creatine Kinase     ()  units/L


 


Troponin T     (0.00-0.029)  ng/mL


 


C-Reactive Protein     (0.00-1.30)  mg/dL


 


Total Protein     (6.3-8.2)  g/dL


 


Albumin     (3.9-5)  g/dL


 


Albumin/Globulin Ratio     %


 


Salicylates   < 0.3 L   (2.8-20.0)  mg/dL


 


Acetaminophen    5.0 L  (10.0-30.0)  ug/mL


 


Plasma/Serum Alcohol     (0-0.07)  %














  21 Range/Units





  17:12 17:12 17:12 


 


WBC     (4.5-11.0)  K/mm3


 


RBC     (3.65-5.03)  M/mm3


 


Hgb     (10.1-14.3)  gm/dl


 


Hct     (30.3-42.9)  %


 


MCV     (79-97)  fl


 


MCH     (28-32)  pg


 


MCHC     (30-34)  %


 


RDW     (13.2-15.2)  %


 


Plt Count     (140-440)  K/mm3


 


Lymph % (Auto)     (13.4-35.0)  %


 


Mono % (Auto)     (0.0-7.3)  %


 


Eos % (Auto)     (0.0-4.3)  %


 


Baso % (Auto)     (0.0-1.8)  %


 


Lymph # (Auto)     (1.2-5.4)  K/mm3


 


Mono # (Auto)     (0.0-0.8)  K/mm3


 


Eos # (Auto)     (0.0-0.4)  K/mm3


 


Baso # (Auto)     (0.0-0.1)  K/mm3


 


Seg Neutrophils %     (40.0-70.0)  %


 


Seg Neutrophils #     (1.8-7.7)  K/mm3


 


PT     (12.2-14.9)  Sec.


 


INR     (0.87-1.13)  


 


APTT     (24.2-36.6)  Sec.


 


D-Dimer     (0-234)  ng/mlDDU


 


Sodium     (137-145)  mmol/L


 


Chloride     ()  mmol/L


 


Carbon Dioxide     (22-30)  mmol/L


 


Anion Gap     mmol/L


 


BUN     (7-17)  mg/dL


 


Creatinine     (0.6-1.2)  mg/dL


 


Estimated GFR     ml/min


 


BUN/Creatinine Ratio     %


 


Glucose     ()  mg/dL


 


Lactic Acid     (0.7-2.0)  mmol/L


 


Calcium     (8.4-10.2)  mg/dL


 


Magnesium    2.00  (1.7-2.3)  mg/dL


 


Total Bilirubin     (0.1-1.2)  mg/dL


 


AST     (5-40)  units/L


 


ALT     (7-56)  units/L


 


Alkaline Phosphatase     ()  units/L


 


Ammonia   20.0 L   (25-60)  umol/L


 


Lactate Dehydrogenase     ()  units/L


 


Total Creatine Kinase    57  ()  units/L


 


Troponin T     (0.00-0.029)  ng/mL


 


C-Reactive Protein     (0.00-1.30)  mg/dL


 


Total Protein     (6.3-8.2)  g/dL


 


Albumin     (3.9-5)  g/dL


 


Albumin/Globulin Ratio     %


 


Salicylates     (2.8-20.0)  mg/dL


 


Acetaminophen     (10.0-30.0)  ug/mL


 


Plasma/Serum Alcohol  < 0.01    (0-0.07)  %














- EKG Data


-: EKG Interpreted by Me


EKG shows normal: sinus rhythm


Rate: normal





- EKG Data


When compared to previous EKG there are: previous EKG unavailable





21 17:06


EKG interpreted at 16: 59





Sinus rhythm, 62 bpm.  Normal axis, low voltage,  ms.  Motion artifact.  

Abnormal EKG.  Not a STEMI.





- Radiology Data


Radiology results: pending, report reviewed, image reviewed





[facility] [address] [place] [address] Cat

Scan Report Signed Patient: LINDA ASH MR#: [id number] 4 : 1955 Acct:

[id number] Age/Sex: 65 / F ADM Date: 21 Loc: ED Attending Dr: Ordering 

Physician: TOBI STEEL MD Date of Service: 21 Procedure(s): CT chest

wo con Accession Number(s): [id number] cc: TOBI STEEL MD CT CHEST, ABDOMEN, 

AND PELVIS WITHOUT CONTRAST INDICATION / CLINICAL INFORMATION: sepsis, covid vs 

port infection vs other. TECHNIQUE: Axial CT images were obtained through the 

est, abdomen, and pelvis without contrast. All CT scans at this location are 

performed using CT dose reduction for ALARA by means of automated exposure 

control. COMPARISON: None available. 





FINDINGS: HEART: No significant abnormality.





 THORACIC AORTA: No significant abnormality. MEDIASTINUM and ANNA: No 

significant abnormality. 





LUNGS: There are mild patchy groundglass density changes throughout the lungs 

without focal airspace consolidation. No suspicious pulmonary nodule or mass is 

identified. 





PLEURA: No significant pleural effusion. No pneumothorax. 





ADDITIONAL CHEST FINDINGS: Left-sided port catheter is noted. No surrounding 

fluid collection. There are numerous prominent and enlarged supraclavicular 

lymph nodes bilaterally. 





LIVER: There is a poorly defined hypoattenuating lesion within the right hepatic

lobe near the dome. This measures approximately 6.6 x 5.4 cm. GALLBLADDER: No 

significant abnormality. 





BILE DUCTS: No significant abnormality. 





PANCREAS: No significant abnormality. SPLEEN: No significant abnormality. 





ADRENALS: No significant abnormality. KIDNEYS/URETERS: Innumerable bilateral 

renal cysts are noted. There is no nephroureterolithiasis or obstructive 

uropathy. STOMACH and SMALL BOWEL: No significant abnormality. COLON: No 

significant abnormality. 





APPENDIX: No significant abnormality





. PERITONEUM: No free fluid. No free air. No fluid collection. 





LYMPH NODES: No significant adenopathy. AORTA and ARTERIES: Moderate 

atherosclerotic calcification without acute abnormality.





 IVC and VEINS: No significant abnormality.





 URINARY BLADDER: No significant abnormality. 





REPRODUCTIVE ORGANS: No significant abnormality. ADDITIONAL FINDINGS: None. 





SKELETAL SYSTEM: There is a rounded lytic lesion within the T12 vertebral body. 





IMPRESSION: 1. Large hypoattenuating right hepatic lobe lesion is concerning for

either metastatic disease or primary neoplasm. Further evaluation with MRI or 

PET CT may be helpful for further evaluation. 





2. Supraclavicular lymphadenopathy could also reflect sarah metastatic disease. 





3. Polycystic kidneys. 





4. Mild diffuse groundglass density changes throughout the lungs can be seen 

with atypical or viral infectious processes. 





5. The left-sided port catheter demonstrates no significant abnormality. There 

is no focal fluid collection/abscess. Signer Name: Brendan Farias MD Signed:

2021 5:22 PM Workstation Name: [id number] Transcribed By:  Dictated By:

BRENDAN FARIAS Electronically Authenticated By: BRENDAN FARIAS Signed 

Date/Time: 21 DD/DT: 21 171 











Noncontrast CT scan of the brain negative for acute finding


Critical Care Time: Yes


Critical care time in (mins) excluding proc time.: 45


Critical care attestation.: 


If time is entered above; I have spent that time in minutes in the direct care 

of this critically ill patient, excluding procedure time.








ED Disposition


Clinical Impression: 


 Acute febrile illness, Acute encephalopathy, Suspected 2019 novel coronavirus 

infection, ESRD (end stage renal disease) on dialysis, Obesity, Hyperkalemia





Disposition: DC-09 OP ADMIT IP TO THIS HOSP


Is pt being admited?: Yes


Does the pt Need Aspirin: No


Condition: Serious


Referrals: 


PRIMARY CARE,MD [Primary Care Provider] - 3-5 Days

## 2021-05-24 LAB
ALBUMIN SERPL-MCNC: 3.5 G/DL (ref 3.9–5)
ALT SERPL-CCNC: 7 UNITS/L (ref 7–56)
BUN SERPL-MCNC: 31 MG/DL (ref 7–17)
BUN/CREAT SERPL: 5 %
CALCIUM SERPL-MCNC: 8.7 MG/DL (ref 8.4–10.2)
HCT VFR BLD CALC: 39.3 % (ref 30.3–42.9)
HEMOLYSIS INDEX: 19
HGB BLD-MCNC: 12.8 GM/DL (ref 10.1–14.3)
MCHC RBC AUTO-ENTMCNC: 32 % (ref 30–34)
MCV RBC AUTO: 93 FL (ref 79–97)
PLATELET # BLD: 109 K/MM3 (ref 140–440)
RBC # BLD AUTO: 4.22 M/MM3 (ref 3.65–5.03)

## 2021-05-24 PROCEDURE — 5A1D70Z PERFORMANCE OF URINARY FILTRATION, INTERMITTENT, LESS THAN 6 HOURS PER DAY: ICD-10-PCS | Performed by: INTERNAL MEDICINE

## 2021-05-24 RX ADMIN — GABAPENTIN SCH MG: 300 CAPSULE ORAL at 22:43

## 2021-05-24 RX ADMIN — PANTOPRAZOLE SODIUM SCH MG: 40 TABLET, DELAYED RELEASE ORAL at 11:56

## 2021-05-24 RX ADMIN — CEFEPIME SCH MLS/HR: 1 INJECTION, POWDER, FOR SOLUTION INTRAMUSCULAR; INTRAVENOUS at 23:00

## 2021-05-24 RX ADMIN — INSULIN LISPRO SCH UNIT: 100 INJECTION, SOLUTION INTRAVENOUS; SUBCUTANEOUS at 12:06

## 2021-05-24 RX ADMIN — OXYCODONE AND ACETAMINOPHEN PRN TAB: 5; 325 TABLET ORAL at 19:03

## 2021-05-24 RX ADMIN — Medication SCH UNIT: at 11:56

## 2021-05-24 RX ADMIN — GABAPENTIN SCH: 300 CAPSULE ORAL at 14:00

## 2021-05-24 RX ADMIN — INSULIN LISPRO SCH: 100 INJECTION, SOLUTION INTRAVENOUS; SUBCUTANEOUS at 16:30

## 2021-05-24 RX ADMIN — CEFEPIME SCH MLS/HR: 1 INJECTION, POWDER, FOR SOLUTION INTRAMUSCULAR; INTRAVENOUS at 01:30

## 2021-05-24 RX ADMIN — SEVELAMER CARBONATE SCH: 800 TABLET, FILM COATED ORAL at 08:00

## 2021-05-24 RX ADMIN — CINACALCET HYDROCHLORIDE SCH MG: 30 TABLET, FILM COATED ORAL at 11:56

## 2021-05-24 RX ADMIN — APIXABAN SCH MG: 5 TABLET, FILM COATED ORAL at 22:48

## 2021-05-24 RX ADMIN — Medication SCH ML: at 11:58

## 2021-05-24 RX ADMIN — PREGABALIN SCH MG: 75 CAPSULE ORAL at 11:56

## 2021-05-24 RX ADMIN — INSULIN LISPRO SCH UNIT: 100 INJECTION, SOLUTION INTRAVENOUS; SUBCUTANEOUS at 22:43

## 2021-05-24 RX ADMIN — Medication SCH ML: at 22:43

## 2021-05-24 RX ADMIN — INSULIN LISPRO SCH UNIT: 100 INJECTION, SOLUTION INTRAVENOUS; SUBCUTANEOUS at 08:59

## 2021-05-24 RX ADMIN — SEVELAMER CARBONATE SCH MG: 800 TABLET, FILM COATED ORAL at 19:41

## 2021-05-24 RX ADMIN — PREGABALIN SCH MG: 75 CAPSULE ORAL at 22:42

## 2021-05-24 RX ADMIN — METOPROLOL TARTRATE SCH MG: 25 TABLET, FILM COATED ORAL at 11:56

## 2021-05-24 RX ADMIN — SEVELAMER CARBONATE SCH MG: 800 TABLET, FILM COATED ORAL at 11:57

## 2021-05-24 NOTE — MAGNETIC RESONANCE REPORT
MRI ABDOMEN WITHOUT CONTRAST



INDICATION / CLINICAL INFORMATION: Right hepatic mass on recent CT.



TECHNIQUE: Multiplanar, multisequence series were obtained through the abdomen.



COMPARISON: CT dated 05/23/21. Older CT dated 10/15/14 had to the retrieved by the technologist and p
ushed to PACS.



FINDINGS:



LOWER CHEST: No significant abnormality.

LIVER: Within the posterior segment of the right lobe of the liver there is a low T1/high T2 signal l
esion corresponding to the recent CT abnormality. This lesion measures about 6.6 x 5.1 x 4.9 cm. On t
he CT from 2014, this lesion has characteristic imaging features of hepatic hemangioma there is a sma
ller, 1.8 cm hemangioma in the right lobe of the liver adjacent to the larger hemangioma.

GALLBLADDER: No significant abnormality.  

BILE DUCTS: No significant abnormality.

PANCREAS: No significant abnormality.

SPLEEN: Upper normal size and stable.

ADRENALS: No significant abnormality.

RIGHT KIDNEY / URETER: Multiple simple appearing cysts as seen on recent CT but new since 2014.

LEFT KIDNEY / URETER: Multiple simple appearing cysts as seen on recent CT but new since 2014.



STOMACH / VISUALIZED BOWEL: No significant abnormality. 

PERITONEUM: No free fluid. No free air. No fluid collection.

LYMPH NODES: No significant adenopathy.

AORTA / ARTERIES: No significant abnormality. 

IVC / VEINS: No significant abnormality.



ADDITIONAL FINDINGS: None.



SKELETAL SYSTEM: No significant abnormality.



IMPRESSION:

1. Right hepatic lobe cavernous hemangiomas corresponding to recent CT abnormality.

2. Bilateral multiple simple appearing cysts which could represent polycystic kidney disease on dialy
sis. Clinical correlation is needed.



Signer Name: PARISH Cornejo MD 

Signed: 5/24/2021 4:50 PM

Workstation Name: [id number]

## 2021-05-24 NOTE — CONSULTATION
History of Present Illness





- Reason for Consult


Consult date: 05/24/21


end stage renal disease





- History of Present Illness





This is a 65 year old  female who presents to the hospital for 

evaluation of Fever, Altered Mental Status and Lethargy. On evaluation patient 

was found to have Pneumonia on CXR and an elevated potassium level of 6.3. 

Patient received STAT hemodialysis last night for the Hyperkalemia. Patient is 

now awake and alert and oriented today. Patient has ERSD and is on hemodialysis 

every M,W,F at [facility]. Patient also has history of PVD with

chronic pains to bilateral lower extremities, Neuropathy, DM, HTN and DVT. We 

are being consulted for management of this patient's ESRD.








Past History


Past Medical History: CAD, diabetes, ESRD, hypertension, other (Peripheral 

neuropathy)


Past Surgical History: Other (AV fistula" on the left side)


Social history: lives with family, full code


Family history: hypertension





Medications and Allergies


                                    Allergies











Allergy/AdvReac Type Severity Reaction Status Date / Time


 


sulfamethoxazole Allergy  Shortness Unverified 10/15/14 05:48





[From Bactrim]   of  





   Breath/HIVES  


 


trimethoprim [From Bactrim] Allergy  Shortness Unverified 10/15/14 05:48





   of  





   Breath/HIVES  











                                Home Medications











 Medication  Instructions  Recorded  Confirmed  Last Taken  Type


 


Apixaban [Eliquis] 5 mg PO BID 05/23/21 05/23/21 Unknown History


 


Cholecalciferol (Vitamin D3) 2,000 unit PO QDAY 05/23/21 05/23/21 Unknown Histor

y





[Vitamin D3 2,000 UNIT CAP]     


 


Cinacalcet HCl [Sensipar] 30 mg PO DAILY 05/23/21 05/23/21 Unknown History


 


Gabapentin [Neurontin] 600 mg PO Q8H 05/23/21 05/23/21 Unknown History


 


Insulin Aspart (Nf) [NovoLOG 1 unit SQ DAILY 05/23/21 05/23/21 Unknown History





Flexpen]     


 


Metoprolol [Lopressor] 25 mg PO DAILY 05/23/21 05/23/21 Unknown History


 


Pantoprazole Sodium [Protonix] 40 mg PO DAILY 05/23/21 05/23/21 Unknown History


 


Pregabalin [Lyrica] 75 mg PO BID 05/23/21 05/23/21 Unknown History


 


Sevelamer Carbonate [Renvela] 800 mg PO TIDWM PRN 05/23/21 05/23/21 Unknown 

History


 


Tadalafil [Cialis] 20 mg PO BID 05/23/21 05/23/21 Unknown History


 


Tramadol HCl [traMADol  MG] 1 tab PO DAILY 05/23/21 05/23/21 Unknown 

History


 


dilTIAZem HCl [Diltiazem 24Hr  mg PO DAILY 05/23/21 05/23/21 Unknown 

History





(Cd)]     











Active Meds: 


Active Medications





Acetaminophen (Acetaminophen 325 Mg Tab)  650 mg PO Q4H PRN


   PRN Reason: Pain MILD(1-3)/Fever >100.5/HA


Apixaban (Apixaban 5 Mg Tab)  5 mg PO BID [facility]


Cholecalciferol (Cholecalciferol (Vit D3) 1000 Unit (25 Mcg) Tab)  2,000 unit PO

QDAY [facility]


   Last Admin: 05/24/21 11:56 Dose:  2,000 unit


   Documented by: 


Cinacalcet (Cinacalcet 30 Mg Tab)  30 mg PO DAILY [facility]


   Last Admin: 05/24/21 11:56 Dose:  30 mg


   Documented by: 


Dextrose (Dextrose 50% In Water (25gm) 50 Ml Vial)  50 gm IV Q30MIN PRN; Protoco

l


   PRN Reason: Hypoglycemia


   Last Admin: 05/23/21 19:30 Dose:  50 gm


   Documented by: 


Diltiazem HCl (Diltiazem Cd 180 Mg Cap)  180 mg PO DAILY [facility]


   Last Admin: 05/24/21 11:57 Dose:  180 mg


   Documented by: 


Gabapentin (Gabapentin 300 Mg Cap)  600 mg PO Q8HR [facility]


Cefepime HCl (Cefepime/Ns 1 Gm/100 Ml)  1 gm in 100 mls @ 200 mls/hr IV Q24H 

[facility]; Protocol


   Last Admin: 05/24/21 01:30 Dose:  200 mls/hr


   Documented by: 


Sodium Chloride (Nacl 0.9%)  100 mls @ 999 mls/hr IV SELINA PRN


   PRN Reason: Hypotension


Insulin Human Lispro (Insulin Lispro 100 Unit/Ml)  0 unit SUB-Q ACHS [facility]; 

Protocol


   Last Admin: 05/24/21 08:59 Dose:  3 unit


   Documented by: 


Metoclopramide HCl (Metoclopramide 10 Mg/2 Ml Inj)  5 mg IV Q6H PRN


   PRN Reason: Nausea And Vomiting


Metoprolol Tartrate (Metoprolol Tartrate 25 Mg Tab)  25 mg PO DAILY [facility]


   Last Admin: 05/24/21 11:56 Dose:  25 mg


   Documented by: 


Miscellaneous Medication (Tadalafil [Cialis])  20 mg PO BID [facility]


Morphine Sulfate (Morphine 2 Mg/1 Ml Inj)  2 mg IV Q4H PRN


   PRN Reason: Pain, Moderate (4-6)


Ondansetron HCl (Ondansetron 4 Mg/2 Ml Inj)  4 mg IV Q8H PRN


   PRN Reason: Nausea And Vomiting


Oxycodone/Acetaminophen (Oxycodone /Acetaminophen 5-325mg Tab)  1 tab PO Q6H PRN


   PRN Reason: Pain, Moderate (4-6)


Pantoprazole Sodium (Pantoprazole 40 Mg Tab)  40 mg PO DAILY [facility]


   Last Admin: 05/24/21 11:56 Dose:  40 mg


   Documented by: 


Pregabalin (Pregabalin 75 Mg Cap)  75 mg PO BID [facility]


   Last Admin: 05/24/21 11:56 Dose:  75 mg


   Documented by: 


Sevelamer Carbonate (Sevelamer Carbonate 800 Mg Tab)  3,200 mg PO TIDWM [facility]


   Last Admin: 05/24/21 11:57 Dose:  3,200 mg


   Documented by: 


Sodium Chloride (Sodium Chloride 0.9% 10 Ml Flush Syringe)  10 ml IV BID [facility]


   Last Admin: 05/24/21 11:58 Dose:  10 ml


   Documented by: 


Sodium Chloride (Sodium Chloride 0.9% 10 Ml Flush Syringe)  10 ml IV PRN PRN


   PRN Reason: LINE FLUSH











Review of Systems


Constitutional: fever, fatigue, weakness, chronic pain, no weight loss, no 

weight gain, no chills, no sweats, no anorexia


Ears, nose, mouth and throat: no ear pain, no ear discharge, no tinnitis, no 

decreased hearing, no nose pain, no nasal congestion, no nasal discharge


Breasts: deferred


Cardiovascular: edema, no chest pain, no orthopnea, no palpitations, no 

rapid/irregular heart beat, no syncope, no lightheadedness, no shortness of 

breath


Respiratory: no cough, no cough with sputum, no excessive sputum, no hemoptysis,

 no shortness of breath, no dyspnea on exertion


Gastrointestinal: no abdominal pain, no nausea, no vomiting, no diarrhea, no 

constipation, no change in bowel habits, no hematemesis


Musculoskeletal: shooting leg pain, leg numbness/tingling, muscle weakness, no 

neck stiffness, no neck pain, no shooting arm pain, no arm numbness/tingling, no

 low back pain


Integumentary: redness, wounds, lesions, foot/leg ulcers, no rash, no pruritis, 

no sores


Neurological: weakness, numbness, tingling, seizures, lack of coordination, 

change in mentation, confusion, no transient paralysis, no paralysis, no 

vertigo, no headaches, no migraines, no convulsions, no aphasia, no change in 

speech


Psychiatric: anxiety, no memory loss, no change in sleep habits, no sleep 

disturbances, no insomnia, no hypersomnia, no change in appetite, no change in 

libido, no suicidal ideation


Endocrine: no cold intolerance, no heat intolerance, no polyphagia, no excessive

 thirst, no polydipsia, no polyuria, no nocturia





Exam





- Vital Signs


Vital signs: 


                                   Vital Signs











Temp Pulse Resp BP Pulse Ox


 


 102.3 F H  62   19   141/64   95 


 


 05/23/21 16:11  05/23/21 16:11  05/23/21 16:11  05/23/21 16:11  05/23/21 16:11














- General Appearance


General appearance: well-developed, appears stated age, fatigue


EENT: ATNC, PERRL, hearing intact, vision intact


Neck: Present: neck supple, trachea midline


Respiratory: Decreased Breath Sounds


Heart: S1S2


Gastrointestinal: Present: normoactive bowel sounds


Integumentary: warm and dry, chronic venous stasis


Neurologic: alert and oriented x3


Musculoskeletal: Present: other (has mild edema)





Results





- Lab Results





                                 05/24/21 08:38





                                 05/24/21 08:38


                             Most recent lab results











Calcium  8.7 mg/dL (8.4-10.2)   05/24/21  08:38    


 


Magnesium  2.00 mg/dL (1.7-2.3)   05/23/21  17:12    














Assessment and Plan








Assessment:


Altered Mental Status


Pneumonia, r/o COVID in process


Hyperkalemia


Large Right Hepatic lobe lesion


End Stage Renal Disease


Hypertension


Diabetes Mellitus


PVD








Plan:


-STAT hemodialysis was done yesterday for Hyperkalemia


-Hemodialysis again today for UF and clearance


-Fluid restriction of 1 liter per day


-Obtain daily weights


-Monitor I/O's


-Assess dialysis needs daily

## 2021-05-24 NOTE — PROGRESS NOTE
Assessment and Plan


Assessment and plan: 


Patient was admitted with fever, lethargy, altered level of consciousness 

initial work-up is consistent with pneumonia on chest x-ray, end-stage renal 

disease with hyperkalemia, evaluated by nephrology received hemodialysis last 

night.  This morning patient is more alert and awake responding appropriately


ID nephrology consulted.  Patient is also PUI on isolation, corona PCR test was 

sent pending report.








-- Acute toxic metabolic encephalopathy


Current Visit: Yes   Status: Acute   


Secondary to uremia and possible bacteremia


And pneumonia, improved


Patient is more alert and awake responding appropriately





-- Bilateral pneumonia


Current Visit: Yes   Status: Acute   


Possible  community-acquired pneumonia


Continue empiric antibiotics.  Vanco and cefepime


follow cultures, Procalcitonin is high





--PUI /suspected Covid 19 infection


Current Visit: Yes   Status: Acute   


Coronavirus PCR i test n the morning


Pending report





-- Liver mass, right lobe


Current Visit: Yes   Status: Acute   


Hepatic mass on CAT scan of the abdomen and CAT scan of the lung


Check MRI of the abdomen recommended


If necessary PET scan but not at this point





-- ESRD (end stage renal disease) on dialysis


Current Visit: Yes   Status: Chronic   


Received emergent hemodialysis last night


HD per schedule, nephrology following





--Hyperkalemia


Current Visit: Yes   Status: Acute   


Hyperkalemia treated in the emergency room


Hyperkalemia cocktail given in the emergency room


Follow electrolytes, HD per schedule





--IDDM (insulin dependent diabetes mellitus)


Current Visit: Yes   Status: Chronic   


Accu-Chek sliding scale coverage ADA diet 


Long-acting insulin as needed 


Check A1c if not done in the last 6 months





--Obesity; BMI 38.6


Current Visit: Yes   Status: Chronic


Patient needs diet modification exercise as tolerated and weight reduction


When medically stable


Also outpatient pulmonary evaluation to rule out KATERIN   





--Full code





--DVT prophylaxis


Current Visit: Yes   Status: Acute   


On heparin renal dose





We will closely monitor the patient and adjust management as needed


Consultant recommendations noted and appreciated


Plan of care reviewed with the patient and her nurse





5/24/2021; follow corona PCR test report


Follow consultants and recommendations














History


Interval history: 


I have seen and examined the patient at the bedside this morning


Patient's chart and medications reviewed


Patient was admitted with PUI right upper lung mass/pneumonia as well as CHF


Patient complains of mild shortness of breath


Morbidly obese


Vital signs noted








Hospitalist Physical





- Constitutional


Vitals: 


                                        











Temp Pulse Resp BP Pulse Ox


 


 98.9 F   61   16   92/47   97 


 


 05/24/21 06:26  05/24/21 06:26  05/24/21 06:26  05/24/21 06:26  05/24/21 06:26











General appearance: Present: no acute distress, well-nourished, obese





- EENT


Eyes: Present: PERRL, EOM intact





- Neck


Neck: Present: supple, normal ROM





- Respiratory


Respiratory effort: normal


Respiratory: bilateral: diminished, rhonchi, negative: rales, wheezing





- Cardiovascular


Rhythm: regular


Heart Sounds: Present: S1 & S2





- Extremities


Extremities: no ischemia, No edema





- Abdominal


General gastrointestinal: soft, non-tender, non-distended, normal bowel sounds





- Integumentary


Integumentary: Present: clear, warm





- Psychiatric


Psychiatric: appropriate mood/affect, cooperative





- Neurologic


Neurologic: moves all extremities





HEART Score





- HEART Score


Age: 45-65


Risk factors: > 3 risk factors or hx of atherosclerotic disease


Troponin: 


                                        











Troponin T  0.088 ng/mL (0.00-0.029)  H  05/23/21  17:12    














- Critical Actions


Critical Actions: 4-6 pts:12-16.6% risk of adverse cardiac event. Should be adm

itted





Results





- Labs


CBC & Chem 7: 


                                 05/24/21 08:38





                                 05/24/21 08:38


Labs: 


                             Laboratory Last Values











WBC  10.1 K/mm3 (4.5-11.0)   05/23/21  17:12    


 


RBC  4.15 M/mm3 (3.65-5.03)   05/23/21  17:12    


 


Hgb  12.7 gm/dl (10.1-14.3)   05/23/21  17:12    


 


Hct  37.9 % (30.3-42.9)   05/23/21  17:12    


 


MCV  91 fl (79-97)   05/23/21  17:12    


 


MCH  31 pg (28-32)   05/23/21  17:12    


 


MCHC  34 % (30-34)   05/23/21  17:12    


 


RDW  17.9 % (13.2-15.2)  H  05/23/21  17:12    


 


Plt Count  119 K/mm3 (140-440)  L  05/23/21  17:12    


 


Lymph % (Auto)  1.8 % (13.4-35.0)  L  05/23/21  17:12    


 


Mono % (Auto)  8.1 % (0.0-7.3)  H  05/23/21  17:12    


 


Eos % (Auto)  1.6 % (0.0-4.3)   05/23/21  17:12    


 


Baso % (Auto)  0.4 % (0.0-1.8)   05/23/21  17:12    


 


Lymph # (Auto)  0.2 K/mm3 (1.2-5.4)  L  05/23/21  17:12    


 


Mono # (Auto)  0.8 K/mm3 (0.0-0.8)   05/23/21  17:12    


 


Eos # (Auto)  0.2 K/mm3 (0.0-0.4)   05/23/21  17:12    


 


Baso # (Auto)  0.0 K/mm3 (0.0-0.1)   05/23/21  17:12    


 


Seg Neutrophils %  88.1 % (40.0-70.0)  H  05/23/21  17:12    


 


Seg Neutrophils #  8.9 K/mm3 (1.8-7.7)  H  05/23/21  17:12    


 


PT  16.2 Sec. (12.2-14.9)  H  05/23/21  17:12    


 


INR  1.32  (0.87-1.13)  H  05/23/21  17:12    


 


APTT  33.7 Sec. (24.2-36.6)   05/23/21  17:12    


 


D-Dimer  408.28 ng/mlDDU (0-234)  H  05/23/21  17:12    


 


Sodium  134 mmol/L (137-145)  L  05/23/21  17:12    


 


Potassium  6.4 mmol/L (3.6-5.0)  H*  05/23/21  17:12    


 


Chloride  88.3 mmol/L ()  L  05/23/21  17:12    


 


Carbon Dioxide  31 mmol/L (22-30)  H  05/23/21  17:12    


 


Anion Gap  21 mmol/L  05/23/21  17:12    


 


BUN  49 mg/dL (7-17)  H  05/23/21  17:12    


 


Creatinine  8.0 mg/dL (0.6-1.2)  H  05/23/21  17:12    


 


Estimated GFR  6 ml/min  05/23/21  17:12    


 


BUN/Creatinine Ratio  6 %  05/23/21  17:12    


 


Glucose  257 mg/dL ()  H  05/23/21  17:12    


 


POC Glucose  193 mg/dL ()  H  05/24/21  00:19    


 


Lactic Acid  1.70 mmol/L (0.7-2.0)   05/23/21  17:12    


 


Calcium  8.6 mg/dL (8.4-10.2)   05/23/21  17:12    


 


Magnesium  2.00 mg/dL (1.7-2.3)   05/23/21  17:12    


 


Ferritin  203.6 ng/mL (10.0-200.0)  H  05/23/21  17:12    


 


Total Bilirubin  0.80 mg/dL (0.1-1.2)   05/23/21  17:12    


 


AST  10 units/L (5-40)   05/23/21  17:12    


 


ALT  8 units/L (7-56)   05/23/21  17:12    


 


Alkaline Phosphatase  155 units/L ()  H  05/23/21  17:12    


 


Ammonia  20.0 umol/L (25-60)  L  05/23/21  17:12    


 


Lactate Dehydrogenase  219 units/L ()  H  05/23/21  17:12    


 


Total Creatine Kinase  57 units/L ()   05/23/21  17:12    


 


Troponin T  0.088 ng/mL (0.00-0.029)  H  05/23/21  17:12    


 


C-Reactive Protein  5.00 mg/dL (0.00-1.30)  H  05/23/21  17:12    


 


Total Protein  7.2 g/dL (6.3-8.2)   05/23/21  17:12    


 


Albumin  4.0 g/dL (3.9-5)   05/23/21  17:12    


 


Albumin/Globulin Ratio  1.3 %  05/23/21  17:12    


 


Triglycerides  104 mg/dL (2-149)   05/23/21  17:12    


 


Cholesterol  158 mg/dL ()   05/23/21  17:12    


 


LDL Cholesterol Direct  90 mg/dL ()   05/23/21  17:12    


 


HDL Cholesterol  60 mg/dL (40-59)  H  05/23/21  17:12    


 


Cholesterol/HDL Ratio  2.63 %  05/23/21  17:12    


 


TSH  0.696 mlU/mL (0.270-4.200)   05/23/21  17:12    


 


Salicylates  < 0.3 mg/dL (2.8-20.0)  L  05/23/21  17:12    


 


Acetaminophen  5.0 ug/mL (10.0-30.0)  L  05/23/21  17:12    


 


Plasma/Serum Alcohol  < 0.01 % (0-0.07)   05/23/21  17:12    


 


Hepatitis A IgM Ab  Non-reactive  (NonReactive)   05/23/21  20:52    


 


Hep Bs Antigen  Non-reactive  (Negative)   05/23/21  20:52    


 


Hep B Core IgM Ab  Non-reactive  (NonReactive)   05/23/21  20:52    


 


Hepatitis C Antibody  Non-reactive  (NonReactive)   05/23/21  20:52    


 


Blood Type  O POSITIVE   05/23/21  17:12    


 


Antibody Screen  Negative   05/23/21  17:12    











Microbiology: 


Microbiology





05/23/21 17:21   Peripheral/Venous   Blood Culture - Preliminary


                            Culture in Progress


05/23/21 16:58   Peripheral/Venous   Blood Culture - Preliminary


                            Culture in Progress











Active Medications





- Current Medications


Current Medications: 














Generic Name Dose Route Start Last Admin





  Trade Name Freq  PRN Reason Stop Dose Admin


 


Acetaminophen  650 mg  05/23/21 22:33 





  Acetaminophen 325 Mg Tab  PO  





  Q4H PRN  





  Pain MILD(1-3)/Fever >100.5/HA  


 


Apixaban  5 mg  05/24/21 22:30 





  Apixaban 5 Mg Tab  PO  





  BID [facility]  


 


Cholecalciferol  2,000 unit  05/24/21 10:00 





  Cholecalciferol (Vit D3) 1000 Unit (25 Mcg) Tab  PO  





  QDAY [facility]  


 


Cinacalcet  30 mg  05/24/21 10:00 





  Cinacalcet 30 Mg Tab  PO  





  DAILY [facility]  


 


Dextrose  50 gm  05/23/21 18:00  05/23/21 19:30





  Dextrose 50% In Water (25gm) 50 Ml Vial  IV   50 gm





  Q30MIN PRN   Administration





  Hypoglycemia  





  Protocol  


 


Diltiazem HCl  180 mg  05/24/21 10:00 





  Diltiazem Cd 180 Mg Cap  PO  





  DAILY [facility]  


 


Gabapentin  600 mg  05/23/21 22:30 





  Gabapentin 300 Mg Cap  PO  





  Q8HR [facility]  


 


Cefepime HCl  1 gm in 100 mls @ 200 mls/hr  05/23/21 23:00  05/24/21 01:30





  Cefepime/Ns 1 Gm/100 Ml  IV   200 mls/hr





  Q24H [facility]   Administration





  Protocol  


 


Insulin Human Lispro  0 unit  05/24/21 07:30 





  Insulin Lispro 100 Unit/Ml  SUB-Q  





  [facility]  





  Protocol  


 


Metoclopramide HCl  5 mg  05/23/21 22:48 





  Metoclopramide 10 Mg/2 Ml Inj  IV  





  Q6H PRN  





  Nausea And Vomiting  


 


Metoprolol Tartrate  25 mg  05/24/21 10:00 





  Metoprolol Tartrate 25 Mg Tab  PO  





  DAILY [facility]  


 


Miscellaneous Medication  20 mg  05/23/21 22:30 





  Tadalafil [Cialis]  PO  





  BID [facility]  


 


Morphine Sulfate  2 mg  05/23/21 22:33 





  Morphine 2 Mg/1 Ml Inj  IV  





  Q4H PRN  





  Pain, Moderate (4-6)  


 


Ondansetron HCl  4 mg  05/23/21 22:33 





  Ondansetron 4 Mg/2 Ml Inj  IV  





  Q8H PRN  





  Nausea And Vomiting  


 


Oxycodone/Acetaminophen  1 tab  05/23/21 22:33 





  Oxycodone /Acetaminophen 5-325mg Tab  PO  





  Q6H PRN  





  Pain, Moderate (4-6)  


 


Pantoprazole Sodium  40 mg  05/24/21 10:00 





  Pantoprazole 40 Mg Tab  PO  





  DAILY [facility]  


 


Pregabalin  75 mg  05/24/21 10:00 





  Pregabalin 75 Mg Cap  PO  





  BID [facility]  


 


Sevelamer Carbonate  3,200 mg  05/24/21 08:00 





  Sevelamer Carbonate 800 Mg Tab  PO  





  TIDWM [facility]  


 


Sodium Chloride  10 ml  05/24/21 10:00 





  Sodium Chloride 0.9% 10 Ml Flush Syringe  IV  





  BID [facility]  


 


Sodium Chloride  10 ml  05/23/21 22:33 





  Sodium Chloride 0.9% 10 Ml Flush Syringe  IV  





  PRN PRN  





  LINE FLUSH

## 2021-05-24 NOTE — CONSULTATION
History of Present Illness





- Reason for Consult


Consult date: 05/24/21


COVID-19 PUI


Requesting physician: TOBI STEEL





- History of Present Illness





The patient is a 65-year-old female with hypertension, ESRD, diabetes was 

admitted to the hospital with altered mental status, low-grade fever.  Upon 

evaluation in the ER, CT scan of the chest, abdomen and pelvis showed bilateral 

groundglass opacities concerning for possible pneumonia.  Also present was a 

large right hepatic lobe lesion concerning for neoplasm.





Review of Systems: 


reviewed in the chart, unable to obtain, minimize risk of transmission





Past History


Past Medical History: CAD, diabetes, ESRD, hypertension, other (Peripheral 

neuropathy)


Past Surgical History: Other (AV fistula" on the left side)


Social history: lives with family, full code


Family history: hypertension





Medications and Allergies


                                    Allergies











Allergy/AdvReac Type Severity Reaction Status Date / Time


 


sulfamethoxazole Allergy  Shortness Unverified 10/15/14 05:48





[From Bactrim]   of  





   Breath/HIVES  


 


trimethoprim [From Bactrim] Allergy  Shortness Unverified 10/15/14 05:48





   of  





   Breath/HIVES  











                                Home Medications











 Medication  Instructions  Recorded  Confirmed  Last Taken  Type


 


Apixaban [Eliquis] 5 mg PO BID 05/23/21 05/23/21 Unknown History


 


Cholecalciferol (Vitamin D3) 2,000 unit PO QDAY 05/23/21 05/23/21 Unknown 

History





[Vitamin D3 2,000 UNIT CAP]     


 


Cinacalcet HCl [Sensipar] 30 mg PO DAILY 05/23/21 05/23/21 Unknown History


 


Gabapentin [Neurontin] 600 mg PO Q8H 05/23/21 05/23/21 Unknown History


 


Insulin Aspart (Nf) [NovoLOG 1 unit SQ DAILY 05/23/21 05/23/21 Unknown History





Flexpen]     


 


Metoprolol [Lopressor] 25 mg PO DAILY 05/23/21 05/23/21 Unknown History


 


Pantoprazole Sodium [Protonix] 40 mg PO DAILY 05/23/21 05/23/21 Unknown History


 


Pregabalin [Lyrica] 75 mg PO BID 05/23/21 05/23/21 Unknown History


 


Sevelamer Carbonate [Renvela] 800 mg PO TIDWM PRN 05/23/21 05/23/21 Unknown 

History


 


Tadalafil [Cialis] 20 mg PO BID 05/23/21 05/23/21 Unknown History


 


Tramadol HCl [traMADol  MG] 1 tab PO DAILY 05/23/21 05/23/21 Unknown 

History


 


dilTIAZem HCl [Diltiazem 24Hr  mg PO DAILY 05/23/21 05/23/21 Unknown 

History





(Cd)]     











Active Meds: 


Active Medications





Acetaminophen (Acetaminophen 325 Mg Tab)  650 mg PO Q4H PRN


   PRN Reason: Pain MILD(1-3)/Fever >100.5/HA


Apixaban (Apixaban 5 Mg Tab)  5 mg PO BID [facility]


Cholecalciferol (Cholecalciferol (Vit D3) 1000 Unit (25 Mcg) Tab)  2,000 unit PO

QDAY [facility]


   Last Admin: 05/24/21 11:56 Dose:  2,000 unit


   Documented by: 


Cinacalcet (Cinacalcet 30 Mg Tab)  30 mg PO DAILY [facility]


   Last Admin: 05/24/21 11:56 Dose:  30 mg


   Documented by: 


Dextrose (Dextrose 50% In Water (25gm) 50 Ml Vial)  50 gm IV Q30MIN PRN; 

Protocol


   PRN Reason: Hypoglycemia


   Last Admin: 05/23/21 19:30 Dose:  50 gm


   Documented by: 


Diltiazem HCl (Diltiazem Cd 180 Mg Cap)  180 mg PO DAILY [facility]


   Last Admin: 05/24/21 11:57 Dose:  180 mg


   Documented by: 


Gabapentin (Gabapentin 300 Mg Cap)  600 mg PO Q8HR [facility]


Cefepime HCl (Cefepime/Ns 1 Gm/100 Ml)  1 gm in 100 mls @ 200 mls/hr IV Q24H 

[facility]; Protocol


   Last Admin: 05/24/21 01:30 Dose:  200 mls/hr


   Documented by: 


Sodium Chloride (Nacl 0.9%)  100 mls @ 999 mls/hr IV SELINA PRN


   PRN Reason: Hypotension


Insulin Human Lispro (Insulin Lispro 100 Unit/Ml)  0 unit SUB-Q ACHS [facility]; 

Protocol


   Last Admin: 05/24/21 12:06 Dose:  2 unit


   Documented by: 


Metoclopramide HCl (Metoclopramide 10 Mg/2 Ml Inj)  5 mg IV Q6H PRN


   PRN Reason: Nausea And Vomiting


Metoprolol Tartrate (Metoprolol Tartrate 25 Mg Tab)  25 mg PO DAILY [facility]


   Last Admin: 05/24/21 11:56 Dose:  25 mg


   Documented by: 


Miscellaneous Medication (Tadalafil [Cialis])  20 mg PO BID [facility]


Morphine Sulfate (Morphine 2 Mg/1 Ml Inj)  2 mg IV Q4H PRN


   PRN Reason: Pain, Moderate (4-6)


Ondansetron HCl (Ondansetron 4 Mg/2 Ml Inj)  4 mg IV Q8H PRN


   PRN Reason: Nausea And Vomiting


Oxycodone/Acetaminophen (Oxycodone /Acetaminophen 5-325mg Tab)  1 tab PO Q6H PRN


   PRN Reason: Pain, Moderate (4-6)


Pantoprazole Sodium (Pantoprazole 40 Mg Tab)  40 mg PO DAILY [facility]


   Last Admin: 05/24/21 11:56 Dose:  40 mg


   Documented by: 


Pregabalin (Pregabalin 75 Mg Cap)  75 mg PO BID [facility]


   Last Admin: 05/24/21 11:56 Dose:  75 mg


   Documented by: 


Sevelamer Carbonate (Sevelamer Carbonate 800 Mg Tab)  3,200 mg PO TIDWM [facility]


   Last Admin: 05/24/21 11:57 Dose:  3,200 mg


   Documented by: 


Sodium Chloride (Sodium Chloride 0.9% 10 Ml Flush Syringe)  10 ml IV BID [facility]


   Last Admin: 05/24/21 11:58 Dose:  10 ml


   Documented by: 


Sodium Chloride (Sodium Chloride 0.9% 10 Ml Flush Syringe)  10 ml IV PRN PRN


   PRN Reason: LINE FLUSH











Physical Examination





- Physical Exam


Narrative exam: 





Physical Exam (reviewed in chart to minimize risk of transmission)


Constitutional: deferred


Head, Ears, Nose: deferred


Eyes: deferred


Neck: deferred


Oral: deferred


Cardiovascular: deferred


Respiratory: deferred


GI: deferred


Musculoskeletal: deferred


Skin: deferred


Hem/Lymphatic: deferred


Psych: deferred


Neurological: deferred





- Constitutional


Vitals: 


                                   Vital Signs











Temp Pulse Resp BP Pulse Ox


 


 98.9 F   61   16   92/47   97 


 


 05/24/21 06:26  05/24/21 06:26  05/24/21 06:26  05/24/21 06:26  05/24/21 06:26








                           Temperature -Last 24 Hours











Temperature                    98.9 F


 


Temperature                    98.9 F


 


Temperature                    98.9 F


 


Temperature                    98.5 F


 


Temperature                    99.2 F


 


Temperature                    102.3 F


 


Temperature                    102.3 F

















Results





- Labs


CBC & Chem 7: 


                                 05/24/21 08:38





                                 05/24/21 08:38


Labs: 


                              Abnormal lab results











  05/23/21 05/23/21 05/23/21 Range/Units





  17:12 17:12 17:12 


 


RDW  17.9 H    (13.2-15.2)  %


 


Plt Count  119 L    (140-440)  K/mm3


 


Lymph % (Auto)  1.8 L    (13.4-35.0)  %


 


Mono % (Auto)  8.1 H    (0.0-7.3)  %


 


Lymph # (Auto)  0.2 L    (1.2-5.4)  K/mm3


 


Seg Neutrophils %  88.1 H    (40.0-70.0)  %


 


Seg Neutrophils #  8.9 H    (1.8-7.7)  K/mm3


 


PT   16.2 H   (12.2-14.9)  Sec.


 


INR   1.32 H   (0.87-1.13)  


 


D-Dimer   408.28 H   (0-234)  ng/mlDDU


 


Sodium    134 L  (137-145)  mmol/L


 


Potassium    6.4 H*  (3.6-5.0)  mmol/L


 


Chloride    88.3 L  ()  mmol/L


 


Carbon Dioxide    31 H  (22-30)  mmol/L


 


BUN    49 H  (7-17)  mg/dL


 


Creatinine    8.0 H  (0.6-1.2)  mg/dL


 


Glucose    257 H  ()  mg/dL


 


POC Glucose     ()  mg/dL


 


Hemoglobin A1c     (4-6)  %


 


Ferritin     (10.0-200.0)  ng/mL


 


Alkaline Phosphatase    155 H  ()  units/L


 


Ammonia     (25-60)  umol/L


 


Lactate Dehydrogenase    219 H  ()  units/L


 


Troponin T    0.088 H  (0.00-0.029)  ng/mL


 


C-Reactive Protein    5.00 H  (0.00-1.30)  mg/dL


 


Albumin     (3.9-5)  g/dL


 


HDL Cholesterol    60 H  (40-59)  mg/dL


 


Salicylates     (2.8-20.0)  mg/dL


 


Acetaminophen     (10.0-30.0)  ug/mL














  05/23/21 05/23/21 05/23/21 Range/Units





  17:12 17:12 17:12 


 


RDW     (13.2-15.2)  %


 


Plt Count     (140-440)  K/mm3


 


Lymph % (Auto)     (13.4-35.0)  %


 


Mono % (Auto)     (0.0-7.3)  %


 


Lymph # (Auto)     (1.2-5.4)  K/mm3


 


Seg Neutrophils %     (40.0-70.0)  %


 


Seg Neutrophils #     (1.8-7.7)  K/mm3


 


PT     (12.2-14.9)  Sec.


 


INR     (0.87-1.13)  


 


D-Dimer     (0-234)  ng/mlDDU


 


Sodium     (137-145)  mmol/L


 


Potassium     (3.6-5.0)  mmol/L


 


Chloride     ()  mmol/L


 


Carbon Dioxide     (22-30)  mmol/L


 


BUN     (7-17)  mg/dL


 


Creatinine     (0.6-1.2)  mg/dL


 


Glucose     ()  mg/dL


 


POC Glucose     ()  mg/dL


 


Hemoglobin A1c     (4-6)  %


 


Ferritin  203.6 H    (10.0-200.0)  ng/mL


 


Alkaline Phosphatase     ()  units/L


 


Ammonia     (25-60)  umol/L


 


Lactate Dehydrogenase     ()  units/L


 


Troponin T     (0.00-0.029)  ng/mL


 


C-Reactive Protein     (0.00-1.30)  mg/dL


 


Albumin     (3.9-5)  g/dL


 


HDL Cholesterol     (40-59)  mg/dL


 


Salicylates   < 0.3 L   (2.8-20.0)  mg/dL


 


Acetaminophen    5.0 L  (10.0-30.0)  ug/mL














  05/23/21 05/24/21 05/24/21 Range/Units





  17:12 00:19 07:42 


 


RDW     (13.2-15.2)  %


 


Plt Count     (140-440)  K/mm3


 


Lymph % (Auto)     (13.4-35.0)  %


 


Mono % (Auto)     (0.0-7.3)  %


 


Lymph # (Auto)     (1.2-5.4)  K/mm3


 


Seg Neutrophils %     (40.0-70.0)  %


 


Seg Neutrophils #     (1.8-7.7)  K/mm3


 


PT     (12.2-14.9)  Sec.


 


INR     (0.87-1.13)  


 


D-Dimer     (0-234)  ng/mlDDU


 


Sodium     (137-145)  mmol/L


 


Potassium     (3.6-5.0)  mmol/L


 


Chloride     ()  mmol/L


 


Carbon Dioxide     (22-30)  mmol/L


 


BUN     (7-17)  mg/dL


 


Creatinine     (0.6-1.2)  mg/dL


 


Glucose     ()  mg/dL


 


POC Glucose   193 H  237 H  ()  mg/dL


 


Hemoglobin A1c     (4-6)  %


 


Ferritin     (10.0-200.0)  ng/mL


 


Alkaline Phosphatase     ()  units/L


 


Ammonia  20.0 L    (25-60)  umol/L


 


Lactate Dehydrogenase     ()  units/L


 


Troponin T     (0.00-0.029)  ng/mL


 


C-Reactive Protein     (0.00-1.30)  mg/dL


 


Albumin     (3.9-5)  g/dL


 


HDL Cholesterol     (40-59)  mg/dL


 


Salicylates     (2.8-20.0)  mg/dL


 


Acetaminophen     (10.0-30.0)  ug/mL














  05/24/21 05/24/21 05/24/21 Range/Units





  08:38 08:38 08:38 


 


RDW  18.7 H    (13.2-15.2)  %


 


Plt Count  109 L    (140-440)  K/mm3


 


Lymph % (Auto)     (13.4-35.0)  %


 


Mono % (Auto)     (0.0-7.3)  %


 


Lymph # (Auto)     (1.2-5.4)  K/mm3


 


Seg Neutrophils %     (40.0-70.0)  %


 


Seg Neutrophils #     (1.8-7.7)  K/mm3


 


PT     (12.2-14.9)  Sec.


 


INR     (0.87-1.13)  


 


D-Dimer     (0-234)  ng/mlDDU


 


Sodium   133 L   (137-145)  mmol/L


 


Potassium   5.6 H   (3.6-5.0)  mmol/L


 


Chloride   92.1 L   ()  mmol/L


 


Carbon Dioxide     (22-30)  mmol/L


 


BUN   31 H   (7-17)  mg/dL


 


Creatinine   5.8 H   (0.6-1.2)  mg/dL


 


Glucose   232 H   ()  mg/dL


 


POC Glucose     ()  mg/dL


 


Hemoglobin A1c    6.8 H  (4-6)  %


 


Ferritin     (10.0-200.0)  ng/mL


 


Alkaline Phosphatase   143 H   ()  units/L


 


Ammonia     (25-60)  umol/L


 


Lactate Dehydrogenase     ()  units/L


 


Troponin T     (0.00-0.029)  ng/mL


 


C-Reactive Protein     (0.00-1.30)  mg/dL


 


Albumin   3.5 L   (3.9-5)  g/dL


 


HDL Cholesterol     (40-59)  mg/dL


 


Salicylates     (2.8-20.0)  mg/dL


 


Acetaminophen     (10.0-30.0)  ug/mL














  05/24/21 Range/Units





  12:00 


 


RDW   (13.2-15.2)  %


 


Plt Count   (140-440)  K/mm3


 


Lymph % (Auto)   (13.4-35.0)  %


 


Mono % (Auto)   (0.0-7.3)  %


 


Lymph # (Auto)   (1.2-5.4)  K/mm3


 


Seg Neutrophils %   (40.0-70.0)  %


 


Seg Neutrophils #   (1.8-7.7)  K/mm3


 


PT   (12.2-14.9)  Sec.


 


INR   (0.87-1.13)  


 


D-Dimer   (0-234)  ng/mlDDU


 


Sodium   (137-145)  mmol/L


 


Potassium   (3.6-5.0)  mmol/L


 


Chloride   ()  mmol/L


 


Carbon Dioxide   (22-30)  mmol/L


 


BUN   (7-17)  mg/dL


 


Creatinine   (0.6-1.2)  mg/dL


 


Glucose   ()  mg/dL


 


POC Glucose  198 H  ()  mg/dL


 


Hemoglobin A1c   (4-6)  %


 


Ferritin   (10.0-200.0)  ng/mL


 


Alkaline Phosphatase   ()  units/L


 


Ammonia   (25-60)  umol/L


 


Lactate Dehydrogenase   ()  units/L


 


Troponin T   (0.00-0.029)  ng/mL


 


C-Reactive Protein   (0.00-1.30)  mg/dL


 


Albumin   (3.9-5)  g/dL


 


HDL Cholesterol   (40-59)  mg/dL


 


Salicylates   (2.8-20.0)  mg/dL


 


Acetaminophen   (10.0-30.0)  ug/mL














- Imaging and Cardiology


Chest x-ray: report reviewed, image reviewed (PORT + b/l airspace opacities)





Assessment and Plan





Cultures:


SARS CoV2 PCR: Pending


5/23/2021 blood culture: In process





A/P:


65-year-old female with hypertension, ESRD, diabetes was admitted to the 

hospital with altered mental status, low-grade fever:





#Sepsis, secondary to bilateral pneumonia, rule out bacteremia. Also ?hepatic 

lesion





#Acute hypoxic respiratory failure:





#Large right hepatic mass





#Indwelling Port-A-Cath





#ESRD on HD: Renally dose antibiotics.





Recs:


-Follow-up COVID-19 PCR


-Follow-up blood cultures


-Empiric cefepime, vancomycin for now


-Consider MRI for better evaluation of hepatic lesion or CT with IV contrast 

coordinated with dialysis








Carlotta Mariscal MD, FACP


[facility] ([facility])


O: [phone]


F: [phone]

## 2021-05-25 LAB
BUN SERPL-MCNC: 26 MG/DL (ref 7–17)
BUN/CREAT SERPL: 5 %
CALCIUM SERPL-MCNC: 8.4 MG/DL (ref 8.4–10.2)
HEMOLYSIS INDEX: 0

## 2021-05-25 RX ADMIN — PANTOPRAZOLE SODIUM SCH MG: 40 TABLET, DELAYED RELEASE ORAL at 09:51

## 2021-05-25 RX ADMIN — Medication SCH UNIT: at 09:51

## 2021-05-25 RX ADMIN — PREGABALIN SCH MG: 75 CAPSULE ORAL at 09:51

## 2021-05-25 RX ADMIN — METOPROLOL TARTRATE SCH: 25 TABLET, FILM COATED ORAL at 10:00

## 2021-05-25 RX ADMIN — INSULIN LISPRO SCH UNIT: 100 INJECTION, SOLUTION INTRAVENOUS; SUBCUTANEOUS at 09:52

## 2021-05-25 RX ADMIN — INSULIN LISPRO SCH UNIT: 100 INJECTION, SOLUTION INTRAVENOUS; SUBCUTANEOUS at 23:28

## 2021-05-25 RX ADMIN — GABAPENTIN SCH: 300 CAPSULE ORAL at 23:25

## 2021-05-25 RX ADMIN — SEVELAMER CARBONATE SCH MG: 800 TABLET, FILM COATED ORAL at 09:49

## 2021-05-25 RX ADMIN — CINACALCET HYDROCHLORIDE SCH MG: 30 TABLET, FILM COATED ORAL at 09:51

## 2021-05-25 RX ADMIN — OXYCODONE AND ACETAMINOPHEN PRN TAB: 5; 325 TABLET ORAL at 21:41

## 2021-05-25 RX ADMIN — INSULIN LISPRO SCH UNIT: 100 INJECTION, SOLUTION INTRAVENOUS; SUBCUTANEOUS at 13:01

## 2021-05-25 RX ADMIN — Medication SCH ML: at 21:36

## 2021-05-25 RX ADMIN — GABAPENTIN SCH MG: 300 CAPSULE ORAL at 21:35

## 2021-05-25 RX ADMIN — APIXABAN SCH MG: 5 TABLET, FILM COATED ORAL at 09:51

## 2021-05-25 RX ADMIN — GABAPENTIN SCH: 300 CAPSULE ORAL at 23:24

## 2021-05-25 RX ADMIN — PREGABALIN SCH MG: 75 CAPSULE ORAL at 21:34

## 2021-05-25 RX ADMIN — Medication SCH ML: at 09:53

## 2021-05-25 RX ADMIN — SEVELAMER CARBONATE SCH MG: 800 TABLET, FILM COATED ORAL at 17:21

## 2021-05-25 RX ADMIN — SEVELAMER CARBONATE SCH MG: 800 TABLET, FILM COATED ORAL at 11:13

## 2021-05-25 RX ADMIN — INSULIN LISPRO SCH UNIT: 100 INJECTION, SOLUTION INTRAVENOUS; SUBCUTANEOUS at 17:26

## 2021-05-25 RX ADMIN — APIXABAN SCH MG: 5 TABLET, FILM COATED ORAL at 21:35

## 2021-05-25 RX ADMIN — GABAPENTIN SCH MG: 300 CAPSULE ORAL at 17:21

## 2021-05-25 NOTE — PROGRESS NOTE
Assessment and Plan


Assessment and plan: 


65-year-old female patient was admitted with fever, lethargy, altered level of 

consciousness initial work-up is consistent with pneumonia on chest x-ray, end-

stage renal disease with hyperkalemia, evaluated by nephrology received 

hemodialysis last night.  This morning patient is more alert and awake resp

onding appropriately, ID nephrology consulted.  corona PCR test negative, liver 

mass/lesion on CT abdomen, MRI revealed hepatic cavernous hemangioma, GI 

consulted today.  Patient is more alert and awake responding appropriately





-- Acute toxic metabolic encephalopathy


Current Visit: Yes   Status: Acute   


Secondary to uremia and possible bacteremia


And pneumonia, improved


Patient is more alert and awake responding appropriately





-- Bilateral pneumonia


Current Visit: Yes   Status: Acute   


Possible  community-acquired pneumonia


Continue empiric antibiotics.  Vanco and cefepime


follow cultures, Procalcitonin is high





--Sepsis secondary to bilateral pneumonia;


Current Visit: Yes   Status: Acute 


Continue Vanco cefepime, follow cultures


ID following





--PUI /COVID-19 test negative


Current Visit: Yes   Status: Acute   


DC isolation





-- Liver mass, right lobe


Current Visit: Yes   Status: Acute   


Hepatic mass on CAT scan of the abdomen and CAT scan of the lung


MRI of the abdomen , hepatic cavernous hemangioma


GI consult , supportive care





-- ESRD (end stage renal disease) on dialysis


Current Visit: Yes   Status: Chronic   


Received emergent hemodialysis last night


HD per schedule, nephrology following





--Hyperkalemia resolved


Current Visit: Yes   Status: Acute   


Closely monitor electrolytes


Hemodialysis per schedule





--IDDM (insulin dependent diabetes mellitus)


Current Visit: Yes   Status: Chronic   


Accu-Chek sliding scale coverage ADA diet 


Long-acting insulin as needed 


Hb A1c 6.8 , consider low-dose long-acting 70/30 insulin if needed





--Obesity; BMI 38.6


Current Visit: Yes   Status: Chronic


Patient needs diet modification exercise as tolerated and weight reduction


When medically stable


Also outpatient pulmonary evaluation to rule out KATERIN   





--Full code





--DVT prophylaxis


Current Visit: Yes   Status: Acute   


On heparin renal dose





We will closely monitor the patient and adjust management as needed


Consultant recommendations noted and appreciated


Plan of care reviewed with the patient and her nurse





5/24/2021; follow corona PCR test report


Follow consultants and recommendations





5/25/2021; MRI abdomen revealed, hepatic cavernous angioma


Consult GI, COVID-19 test negative, DC isolation








History


Interval history: 


I have seen and examined the patient at the bedside this morning


Patient's chart and medications reviewed


Slightly better no new complaints


COVID-19 test is negative


Vital signs noted








Hospitalist Physical





- Constitutional


Vitals: 


                                        











Temp Pulse Resp BP Pulse Ox


 


 97.8 F   68   16   88/37   95 


 


 05/24/21 23:51  05/24/21 23:51  05/24/21 23:51  05/24/21 23:51  05/24/21 23:51











General appearance: Present: no acute distress, well-nourished, obese





- EENT


Eyes: Present: PERRL, EOM intact





- Neck


Neck: Present: supple, normal ROM





- Respiratory


Respiratory effort: normal


Respiratory: bilateral: diminished, rhonchi, negative: rales, wheezing





- Cardiovascular


Rhythm: regular


Heart Sounds: Present: S1 & S2





- Extremities


Extremities: no ischemia, No edema





- Abdominal


General gastrointestinal: soft, non-tender, non-distended, normal bowel sounds





- Integumentary


Integumentary: Present: clear, warm





- Psychiatric


Psychiatric: appropriate mood/affect, cooperative





- Neurologic


Neurologic: moves all extremities





HEART Score





- HEART Score


Age: 45-65


Risk factors: > 3 risk factors or hx of atherosclerotic disease


Troponin: 


                                        











Troponin T  0.088 ng/mL (0.00-0.029)  H  05/23/21  17:12    














- Critical Actions


Critical Actions: 4-6 pts:12-16.6% risk of adverse cardiac event. Should be 

admitted





Results





- Labs


CBC & Chem 7: 


                                 05/24/21 08:38





                                 05/25/21 06:05


Labs: 


                             Laboratory Last Values











WBC  7.7 K/mm3 (4.5-11.0)   05/24/21  08:38    


 


RBC  4.22 M/mm3 (3.65-5.03)   05/24/21  08:38    


 


Hgb  12.8 gm/dl (10.1-14.3)   05/24/21  08:38    


 


Hct  39.3 % (30.3-42.9)   05/24/21  08:38    


 


MCV  93 fl (79-97)   05/24/21  08:38    


 


MCH  30 pg (28-32)   05/24/21  08:38    


 


MCHC  32 % (30-34)   05/24/21  08:38    


 


RDW  18.7 % (13.2-15.2)  H  05/24/21  08:38    


 


Plt Count  109 K/mm3 (140-440)  L  05/24/21  08:38    


 


Lymph % (Auto)  Np   05/24/21  08:38    


 


Mono % (Auto)  Np   05/24/21  08:38    


 


Eos % (Auto)  Np   05/24/21  08:38    


 


Baso % (Auto)  Np   05/24/21  08:38    


 


Lymph # (Auto)  Np   05/24/21  08:38    


 


Mono # (Auto)  Np   05/24/21  08:38    


 


Eos # (Auto)  Np   05/24/21  08:38    


 


Baso # (Auto)  Np   05/24/21  08:38    


 


Seg Neutrophils %  Np   05/24/21  08:38    


 


Seg Neutrophils #  Np   05/24/21  08:38    


 


PT  16.2 Sec. (12.2-14.9)  H  05/23/21  17:12    


 


INR  1.32  (0.87-1.13)  H  05/23/21  17:12    


 


APTT  33.7 Sec. (24.2-36.6)   05/23/21  17:12    


 


D-Dimer  408.28 ng/mlDDU (0-234)  H  05/23/21  17:12    


 


Sodium  134 mmol/L (137-145)  L  05/25/21  06:05    


 


Potassium  4.6 mmol/L (3.6-5.0)   05/25/21  06:05    


 


Chloride  91.7 mmol/L ()  L  05/25/21  06:05    


 


Carbon Dioxide  33 mmol/L (22-30)  H  05/25/21  06:05    


 


Anion Gap  14 mmol/L  05/25/21  06:05    


 


BUN  26 mg/dL (7-17)  H  05/25/21  06:05    


 


Creatinine  5.0 mg/dL (0.6-1.2)  H  05/25/21  06:05    


 


Estimated GFR  11 ml/min  05/25/21  06:05    


 


BUN/Creatinine Ratio  5 %  05/25/21  06:05    


 


Glucose  216 mg/dL ()  H  05/25/21  06:05    


 


POC Glucose  265 mg/dL ()  H  05/24/21  21:51    


 


Hemoglobin A1c  6.8 % (4-6)  H  05/24/21  08:38    


 


Lactic Acid  1.70 mmol/L (0.7-2.0)   05/23/21  17:12    


 


Calcium  8.4 mg/dL (8.4-10.2)   05/25/21  06:05    


 


Phosphorus  3.30 mg/dL (2.5-4.5)   05/25/21  06:05    


 


Magnesium  2.00 mg/dL (1.7-2.3)   05/23/21  17:12    


 


Ferritin  203.6 ng/mL (10.0-200.0)  H  05/23/21  17:12    


 


Total Bilirubin  0.70 mg/dL (0.1-1.2)   05/24/21  08:38    


 


AST  11 units/L (5-40)   05/24/21  08:38    


 


ALT  7 units/L (7-56)   05/24/21  08:38    


 


Alkaline Phosphatase  143 units/L ()  H  05/24/21  08:38    


 


Ammonia  20.0 umol/L (25-60)  L  05/23/21  17:12    


 


Lactate Dehydrogenase  219 units/L ()  H  05/23/21  17:12    


 


Total Creatine Kinase  57 units/L ()   05/23/21  17:12    


 


Troponin T  0.088 ng/mL (0.00-0.029)  H  05/23/21  17:12    


 


C-Reactive Protein  5.00 mg/dL (0.00-1.30)  H  05/23/21  17:12    


 


Total Protein  7.1 g/dL (6.3-8.2)   05/24/21  08:38    


 


Albumin  3.5 g/dL (3.9-5)  L  05/24/21  08:38    


 


Albumin/Globulin Ratio  1.0 %  05/24/21  08:38    


 


Triglycerides  104 mg/dL (2-149)   05/23/21  17:12    


 


Cholesterol  158 mg/dL ()   05/23/21  17:12    


 


LDL Cholesterol Direct  90 mg/dL ()   05/23/21  17:12    


 


HDL Cholesterol  60 mg/dL (40-59)  H  05/23/21  17:12    


 


Cholesterol/HDL Ratio  2.63 %  05/23/21  17:12    


 


Procalcitonin  8.81 ng/mL (<0.15)   05/23/21  17:12    


 


TSH  0.696 mlU/mL (0.270-4.200)   05/23/21  17:12    


 


Random Vancomycin  13.0 ug/mL (0-40.0)   05/24/21  08:38    


 


Salicylates  < 0.3 mg/dL (2.8-20.0)  L  05/23/21  17:12    


 


Acetaminophen  5.0 ug/mL (10.0-30.0)  L  05/23/21  17:12    


 


Plasma/Serum Alcohol  < 0.01 % (0-0.07)   05/23/21  17:12    


 


Coronavirus (PCR)  Negative  (Negative)   05/23/21  09:10    


 


Hepatitis A IgM Ab  Non-reactive  (NonReactive)   05/23/21  20:52    


 


Hep Bs Antigen  Non-reactive  (Negative)   05/23/21  20:52    


 


Hep B Core IgM Ab  Non-reactive  (NonReactive)   05/23/21  20:52    


 


Hepatitis C Antibody  Non-reactive  (NonReactive)   05/23/21  20:52    


 


Blood Type  O POSITIVE   05/23/21  17:12    


 


Antibody Screen  Negative   05/23/21  17:12    











Microbiology: 


Microbiology





05/23/21 16:58   Peripheral/Venous   Blood Culture - Preliminary


                            NO GROWTH AFTER 24 HOURS


05/23/21 17:21   Peripheral/Venous   Blood Culture - Preliminary








Cunha/IV: 


                                        





Voiding Method                   Toilet











Active Medications





- Current Medications


Current Medications: 














Generic Name Dose Route Start Last Admin





  Trade Name Freq  PRN Reason Stop Dose Admin


 


Acetaminophen  650 mg  05/23/21 22:33 





  Acetaminophen 325 Mg Tab  PO  





  Q4H PRN  





  Pain MILD(1-3)/Fever >100.5/HA  


 


Apixaban  5 mg  05/24/21 22:30  05/24/21 22:48





  Apixaban 5 Mg Tab  PO   5 mg





  BID JUDY   Administration


 


Cholecalciferol  2,000 unit  05/24/21 10:00  05/24/21 11:56





  Cholecalciferol (Vit D3) 1000 Unit (25 Mcg) Tab  PO   2,000 unit





  QDAY JUDY   Administration


 


Cinacalcet  30 mg  05/24/21 10:00  05/24/21 11:56





  Cinacalcet 30 Mg Tab  PO   30 mg





  DAILY JUDY   Administration


 


Dextrose  50 gm  05/23/21 18:00  05/23/21 19:30





  Dextrose 50% In Water (25gm) 50 Ml Vial  IV   50 gm





  Q30MIN PRN   Administration





  Hypoglycemia  





  Protocol  


 


Diltiazem HCl  180 mg  05/24/21 10:00  05/24/21 11:57





  Diltiazem Cd 180 Mg Cap  PO   180 mg





  DAILY JUDY   Administration


 


Gabapentin  600 mg  05/23/21 22:30  05/24/21 22:43





  Gabapentin 300 Mg Cap  PO   600 mg





  Q8HR JUDY   Administration


 


Cefepime HCl  1 gm in 100 mls @ 200 mls/hr  05/23/21 23:00  05/24/21 23:00





  Cefepime/Ns 1 Gm/100 Ml  IV   200 mls/hr





  Q24H JUDY   Administration





  Protocol  


 


Sodium Chloride  100 mls @ 999 mls/hr  05/24/21 10:03 





  Nacl 0.9%  IV  





  SELINA PRN  





  Hypotension  


 


Insulin Human Lispro  0 unit  05/24/21 07:30  05/24/21 22:43





  Insulin Lispro 100 Unit/Ml  SUB-Q   4 unit





  ACHS JUDY   Administration





  Protocol  


 


Metoclopramide HCl  5 mg  05/23/21 22:48 





  Metoclopramide 10 Mg/2 Ml Inj  IV  





  Q6H PRN  





  Nausea And Vomiting  


 


Metoprolol Tartrate  25 mg  05/24/21 10:00  05/24/21 11:56





  Metoprolol Tartrate 25 Mg Tab  PO   25 mg





  DAILY JUDY   Administration


 


Morphine Sulfate  2 mg  05/23/21 22:33 





  Morphine 2 Mg/1 Ml Inj  IV  





  Q4H PRN  





  Pain, Moderate (4-6)  


 


Ondansetron HCl  4 mg  05/23/21 22:33 





  Ondansetron 4 Mg/2 Ml Inj  IV  





  Q8H PRN  





  Nausea And Vomiting  


 


Oxycodone/Acetaminophen  1 tab  05/23/21 22:33  05/24/21 19:03





  Oxycodone /Acetaminophen 5-325mg Tab  PO   1 tab





  Q6H PRN   Administration





  Pain, Moderate (4-6)  


 


Pantoprazole Sodium  40 mg  05/24/21 10:00  05/24/21 11:56





  Pantoprazole 40 Mg Tab  PO   40 mg





  DAILY JUDY   Administration


 


Pregabalin  75 mg  05/24/21 10:00  05/24/21 22:42





  Pregabalin 75 Mg Cap  PO   75 mg





  BID JUDY   Administration


 


Sevelamer Carbonate  3,200 mg  05/24/21 08:00  05/24/21 19:41





  Sevelamer Carbonate 800 Mg Tab  PO   3,200 mg





  TIDWM JUDY   Administration


 


Sodium Chloride  10 ml  05/24/21 10:00  05/24/21 22:43





  Sodium Chloride 0.9% 10 Ml Flush Syringe  IV   10 ml





  BID JUDY   Administration


 


Sodium Chloride  10 ml  05/23/21 22:33 





  Sodium Chloride 0.9% 10 Ml Flush Syringe  IV  





  PRN PRN  





  LINE FLUSH

## 2021-05-25 NOTE — PROGRESS NOTE
Assessment and Plan


Altered Mental Status


Pneumonia, r/o COVID in process


Hyperkalemia


Large Right Hepatic lobe lesion


End Stage Renal Disease


Hypertension


Diabetes Mellitus


PVD








Plan:


-no indication for HD today


-Fluid restriction of 1 liter per day


-Obtain daily weights


-Monitor I/O's


-Assess dialysis needs daily 








Subjective


Date of service: 05/25/21


Principal diagnosis: ESRD


Interval history: 





tolerated HD yesterday





Objective





- Vital Signs


Vital signs: 


                               Vital Signs - 12hr











  05/25/21





  09:10


 


O2 Sat by Pulse 99





Oximetry 














- Lab





                                 05/24/21 08:38





                                 05/25/21 06:05


                             Most recent lab results











Calcium  8.4 mg/dL (8.4-10.2)   05/25/21  06:05    


 


Phosphorus  3.30 mg/dL (2.5-4.5)   05/25/21  06:05    


 


Magnesium  2.00 mg/dL (1.7-2.3)   05/23/21  17:12    














Medications & Allergies





- Medications


Allergies/Adverse Reactions: 


                                    Allergies





sulfamethoxazole [From Bactrim] Allergy (Unverified 10/15/14 05:48)


   Shortness of Breath/HIVES


trimethoprim [From Bactrim] Allergy (Unverified 10/15/14 05:48)


   Shortness of Breath/HIVES








Home Medications: 


                                Home Medications











 Medication  Instructions  Recorded  Confirmed  Last Taken  Type


 


Apixaban [Eliquis] 5 mg PO BID 05/23/21 05/23/21 Unknown History


 


Cholecalciferol (Vitamin D3) 2,000 unit PO QDAY 05/23/21 05/23/21 Unknown 

History





[Vitamin D3 2,000 UNIT CAP]     


 


Cinacalcet HCl [Sensipar] 30 mg PO DAILY 05/23/21 05/23/21 Unknown History


 


Gabapentin [Neurontin] 600 mg PO Q8H 05/23/21 05/23/21 Unknown History


 


Insulin Aspart (Nf) [NovoLOG 1 unit SQ DAILY 05/23/21 05/23/21 Unknown History





Flexpen]     


 


Metoprolol [Lopressor] 25 mg PO DAILY 05/23/21 05/23/21 Unknown History


 


Pantoprazole Sodium [Protonix] 40 mg PO DAILY 05/23/21 05/23/21 Unknown History


 


Pregabalin [Lyrica] 75 mg PO BID 05/23/21 05/23/21 Unknown History


 


Sevelamer Carbonate [Renvela] 800 mg PO TIDWM PRN 05/23/21 05/23/21 Unknown 

History


 


Tadalafil [Cialis] 20 mg PO BID 05/23/21 05/23/21 Unknown History


 


Tramadol HCl [traMADol  MG] 1 tab PO DAILY 05/23/21 05/23/21 Unknown 

History


 


dilTIAZem HCl [Diltiazem 24Hr  mg PO DAILY 05/23/21 05/23/21 Unknown 

History





(Cd)]     











Active Medications: 














Generic Name Dose Route Start Last Admin





  Trade Name Freq  PRN Reason Stop Dose Admin


 


Acetaminophen  650 mg  05/23/21 22:33 





  Acetaminophen 325 Mg Tab  PO  





  Q4H PRN  





  Pain MILD(1-3)/Fever >100.5/HA  


 


Apixaban  5 mg  05/24/21 22:30  05/25/21 09:51





  Apixaban 5 Mg Tab  PO   5 mg





  BID JUDY   Administration


 


Cholecalciferol  2,000 unit  05/24/21 10:00  05/25/21 09:51





  Cholecalciferol (Vit D3) 1000 Unit (25 Mcg) Tab  PO   2,000 unit





  QDAY JUDY   Administration


 


Cinacalcet  30 mg  05/24/21 10:00  05/25/21 09:51





  Cinacalcet 30 Mg Tab  PO   30 mg





  DAILY JUDY   Administration


 


Dextrose  50 gm  05/23/21 18:00  05/23/21 19:30





  Dextrose 50% In Water (25gm) 50 Ml Vial  IV   50 gm





  Q30MIN PRN   Administration





  Hypoglycemia  





  Protocol  


 


Diltiazem HCl  180 mg  05/24/21 10:00  05/24/21 11:57





  Diltiazem Cd 180 Mg Cap  PO   180 mg





  DAILY JUDY   Administration


 


Gabapentin  600 mg  05/23/21 22:30  05/24/21 22:43





  Gabapentin 300 Mg Cap  PO   600 mg





  Q8HR JUDY   Administration


 


Cefepime HCl  1 gm in 100 mls @ 200 mls/hr  05/23/21 23:00  05/24/21 23:00





  Cefepime/Ns 1 Gm/100 Ml  IV   200 mls/hr





  Q24H JUDY   Administration





  Protocol  


 


Sodium Chloride  100 mls @ 999 mls/hr  05/24/21 10:03 





  Nacl 0.9%  IV  





  SELINA PRN  





  Hypotension  


 


Insulin Human Lispro  0 unit  05/24/21 07:30  05/25/21 09:52





  Insulin Lispro 100 Unit/Ml  SUB-Q   2 unit





  ACHS JUDY   Administration





  Protocol  


 


Metoclopramide HCl  5 mg  05/23/21 22:48 





  Metoclopramide 10 Mg/2 Ml Inj  IV  





  Q6H PRN  





  Nausea And Vomiting  


 


Metoprolol Tartrate  25 mg  05/24/21 10:00  05/24/21 11:56





  Metoprolol Tartrate 25 Mg Tab  PO   25 mg





  DAILY JUDY   Administration


 


Morphine Sulfate  2 mg  05/23/21 22:33 





  Morphine 2 Mg/1 Ml Inj  IV  





  Q4H PRN  





  Pain, Moderate (4-6)  


 


Ondansetron HCl  4 mg  05/23/21 22:33 





  Ondansetron 4 Mg/2 Ml Inj  IV  





  Q8H PRN  





  Nausea And Vomiting  


 


Oxycodone/Acetaminophen  1 tab  05/23/21 22:33  05/24/21 19:03





  Oxycodone /Acetaminophen 5-325mg Tab  PO   1 tab





  Q6H PRN   Administration





  Pain, Moderate (4-6)  


 


Pantoprazole Sodium  40 mg  05/24/21 10:00  05/25/21 09:51





  Pantoprazole 40 Mg Tab  PO   40 mg





  DAILY JUDY   Administration


 


Pregabalin  75 mg  05/24/21 10:00  05/25/21 09:51





  Pregabalin 75 Mg Cap  PO   75 mg





  BID JUDY   Administration


 


Sevelamer Carbonate  3,200 mg  05/24/21 08:00  05/25/21 11:13





  Sevelamer Carbonate 800 Mg Tab  PO   3,200 mg





  TIDWM JUDY   Administration


 


Sodium Chloride  10 ml  05/24/21 10:00  05/25/21 09:53





  Sodium Chloride 0.9% 10 Ml Flush Syringe  IV   10 ml





  BID JUDY   Administration


 


Sodium Chloride  10 ml  05/23/21 22:33 





  Sodium Chloride 0.9% 10 Ml Flush Syringe  IV  





  PRN PRN  





  LINE FLUSH

## 2021-05-25 NOTE — PROGRESS NOTE
Assessment and Plan





Cultures:


SARS CoV2 PCR: negative


5/23/2021 blood culture: 1 out of 4 bottles with GPC in pairs and chains





A/P:


65-year-old female with hypertension, ESRD, diabetes was admitted to the 

hospital with altered mental status, low-grade fever:





#Sepsis, secondary to bilateral pneumonia





#GPC bacteremia: source unclear. ?pneumonia v/s contaminant.





#Acute hypoxic respiratory failure: improved





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 

Stable.





#Indwelling Port-A-Cath





#ESRD on HD: Renally dose antibiotics.





Recs:


-cefepime stopped


-continue with IV vancomycin for now


-f/u blood cultures





Carlotta Mariscal MD, FACP


[facility] ([facility])


O: [phone]


F: [phone]





Subjective


Date of service: 05/25/21


Principal diagnosis: ESRD


Interval history: 





Patient afebrile.  Denies any complaints.  Hard of hearing due to her hearing 

aids not being charged.  Denies abdominal pain.  COVID-19 test came back 

negative.





Objective





- Exam


Narrative Exam: 


Physical Exam: 


Constitutional: Alert, cooperative. No acute distress


Head, Ears, Nose: Normocephalic, atraumatic. External ears, nose normal


Eyes: Conjunctivae/corneas clear. No icterus. No ptosis.


Neck: Supple, no meningeal signs


Cardiovascular: S1, S2 normal. 


Respiratory: Good air entry, clear to auscultation bilaterally


GI: Soft, non-tender; bowel sounds normal. No peritoneal signs


Musculoskeletal: No pedal edema, no cyanosis.


Skin: No rash or abscess.  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

no draining wounds.


Hem/Lymphatic: No palpable cervical or supraclavicular nodes. No lymphangitis


Psych: Mood ok. Affect normal


Neurological: Awake, alert, oriented. Hard of hearing





- Constitutional


Vitals: 


                                   Vital Signs











Temp Pulse Resp BP Pulse Ox


 


 98 F   67   20   91/50   98 


 


 05/25/21 14:00  05/25/21 14:00  05/25/21 14:00  05/25/21 14:00  05/25/21 14:00








                           Temperature -Last 24 Hours











Temperature                    98 F


 


Temperature                    98.6 F


 


Temperature                    97.8 F


 


Temperature                    97.9 F


 


Temperature                    98.2 F

















- Labs


CBC & Chem 7: 


                                 05/24/21 08:38





                                 05/25/21 06:05


Labs: 


                              Abnormal lab results











  05/24/21 05/24/21 05/25/21 Range/Units





  16:30 21:51 06:05 


 


Sodium    134 L  (137-145)  mmol/L


 


Chloride    91.7 L  ()  mmol/L


 


Carbon Dioxide    33 H  (22-30)  mmol/L


 


BUN    26 H  (7-17)  mg/dL


 


Creatinine    5.0 H  (0.6-1.2)  mg/dL


 


Glucose    216 H  ()  mg/dL


 


POC Glucose  137 H  265 H   ()  mg/dL














  05/25/21 Range/Units





  07:42 


 


Sodium   (137-145)  mmol/L


 


Chloride   ()  mmol/L


 


Carbon Dioxide   (22-30)  mmol/L


 


BUN   (7-17)  mg/dL


 


Creatinine   (0.6-1.2)  mg/dL


 


Glucose   ()  mg/dL


 


POC Glucose  188 H  ()  mg/dL

## 2021-05-25 NOTE — ELECTROCARDIOGRAPH REPORT
[facility]

                                       

Test Date:    2021               Test Time:    16:59:11

Pat Name:     LINDA ASH              Department:   

Patient ID:   [id number]          Room:         A351

Gender:       F                        Technician:   NURIS

:          1955               Requested By: TOBI STEEL

Order Number: [id number]              Reading MD:   Matthew Sánchez

                                 Measurements

Intervals                              Axis          

Rate:         62                       P:            56

VT:           188                      QRS:          61

QRSD:         109                      T:            63

QT:           454                                    

QTc:          461                                    

                           Interpretive Statements

Sinus arrhythmia

No previous ECG available for comparison

Electronically Signed On 2021 10:30:10 EDT by Matthew Sánchez

## 2021-05-25 NOTE — EVENT NOTE
Date: 05/25/21


GI consult requested for large R hepatic liver mass in pt with ESRD, admitted 

with altered mental status.  Pt has stable R liver mass, c/w hemangioma by 

triple phase CT on 10/15/14, and confirmed, albeit read as small, by MRI this 

admission.


Transaminases normal.


Alk phos mildly elevated c/w renal osteodystrophy.





No further GI recommendations.  Please call as needed.

## 2021-05-26 PROCEDURE — 5A1D70Z PERFORMANCE OF URINARY FILTRATION, INTERMITTENT, LESS THAN 6 HOURS PER DAY: ICD-10-PCS | Performed by: INTERNAL MEDICINE

## 2021-05-26 RX ADMIN — Medication SCH ML: at 13:21

## 2021-05-26 RX ADMIN — Medication SCH UNIT: at 09:05

## 2021-05-26 RX ADMIN — SEVELAMER CARBONATE SCH MG: 800 TABLET, FILM COATED ORAL at 13:19

## 2021-05-26 RX ADMIN — INSULIN LISPRO SCH UNIT: 100 INJECTION, SOLUTION INTRAVENOUS; SUBCUTANEOUS at 17:40

## 2021-05-26 RX ADMIN — APIXABAN SCH MG: 5 TABLET, FILM COATED ORAL at 09:05

## 2021-05-26 RX ADMIN — OXYCODONE AND ACETAMINOPHEN PRN TAB: 5; 325 TABLET ORAL at 22:06

## 2021-05-26 RX ADMIN — METOPROLOL TARTRATE SCH: 25 TABLET, FILM COATED ORAL at 09:47

## 2021-05-26 RX ADMIN — GABAPENTIN SCH MG: 300 CAPSULE ORAL at 22:06

## 2021-05-26 RX ADMIN — GABAPENTIN SCH MG: 300 CAPSULE ORAL at 06:20

## 2021-05-26 RX ADMIN — PANTOPRAZOLE SODIUM SCH MG: 40 TABLET, DELAYED RELEASE ORAL at 09:05

## 2021-05-26 RX ADMIN — PREGABALIN SCH MG: 75 CAPSULE ORAL at 22:06

## 2021-05-26 RX ADMIN — INSULIN LISPRO SCH UNIT: 100 INJECTION, SOLUTION INTRAVENOUS; SUBCUTANEOUS at 22:07

## 2021-05-26 RX ADMIN — SEVELAMER CARBONATE SCH MG: 800 TABLET, FILM COATED ORAL at 09:05

## 2021-05-26 RX ADMIN — GABAPENTIN SCH MG: 300 CAPSULE ORAL at 13:20

## 2021-05-26 RX ADMIN — SEVELAMER CARBONATE SCH MG: 800 TABLET, FILM COATED ORAL at 17:40

## 2021-05-26 RX ADMIN — GABAPENTIN SCH MG: 300 CAPSULE ORAL at 06:29

## 2021-05-26 RX ADMIN — INSULIN LISPRO SCH UNIT: 100 INJECTION, SOLUTION INTRAVENOUS; SUBCUTANEOUS at 09:07

## 2021-05-26 RX ADMIN — OXYCODONE AND ACETAMINOPHEN PRN TAB: 5; 325 TABLET ORAL at 13:27

## 2021-05-26 RX ADMIN — Medication SCH ML: at 22:07

## 2021-05-26 RX ADMIN — CINACALCET HYDROCHLORIDE SCH MG: 30 TABLET, FILM COATED ORAL at 09:05

## 2021-05-26 RX ADMIN — PREGABALIN SCH MG: 75 CAPSULE ORAL at 09:05

## 2021-05-26 RX ADMIN — APIXABAN SCH MG: 5 TABLET, FILM COATED ORAL at 22:05

## 2021-05-26 RX ADMIN — INSULIN LISPRO SCH UNIT: 100 INJECTION, SOLUTION INTRAVENOUS; SUBCUTANEOUS at 13:20

## 2021-05-26 NOTE — PROGRESS NOTE
Assessment and Plan








Assessment:


Altered Mental Status


Pneumonia


Hyperkalemia


Large Right Hepatic lobe lesion


End Stage Renal Disease


Hypertension, now Hypotensive


Diabetes Mellitus


PVD








Plan:


-Hemodialysis today for UF and clearance


-Fluid restriction of 1 liter per day


-Hold BP meds if Hypotensive


-IV Albumin prn as needed with HD


-Obtain daily weights


-Monitor I/O's daily


-Assess dialysis needs daily 





Subjective


Date of service: 05/26/21


Principal diagnosis: ESRD


Interval history: 





Patient for HD today. BP low today.





Objective





- Vital Signs


Vital signs: 


                               Vital Signs - 12hr











  05/26/21 05/26/21 05/26/21





  04:17 06:21 09:02


 


Temperature 97.8 F  97.6 F


 


Pulse Rate 65  66


 


Respiratory 18  19





Rate   


 


Blood Pressure 88/41 113/62 122/73


 


O2 Sat by Pulse 100  99





Oximetry   


 


O2 Sat by Pulse   





Oximetry [   





Anterior   





Bilateral]   














  05/26/21 05/26/21 05/26/21





  09:48 09:53 10:15


 


Temperature 98.1 F  


 


Pulse Rate 63 66 64


 


Respiratory 16  





Rate   


 


Blood Pressure 91/50 115/63 92/57


 


O2 Sat by Pulse   





Oximetry   


 


O2 Sat by Pulse 100  





Oximetry [   





Anterior   





Bilateral]   














  05/26/21 05/26/21 05/26/21





  10:45 11:15 11:45


 


Temperature   


 


Pulse Rate 65 66 66


 


Respiratory   





Rate   


 


Blood Pressure 107/56 122/65 95/53


 


O2 Sat by Pulse   





Oximetry   


 


O2 Sat by Pulse   





Oximetry [   





Anterior   





Bilateral]   














- General Appearance


General appearance: well-developed, appears stated age


EENT: ATNC, PERRL


Neck: no JVD, supple


Respiratory: Present: Decreased Breath Sounds


Cardiology: S1S2


Integumentary: warm and dry


Neurologic: other (Awake and alert)


Musculoskeletal: other (has chronic PVD, mild edema)





- Lab





                                 05/24/21 08:38





                                 05/25/21 06:05


                             Most recent lab results











Calcium  8.4 mg/dL (8.4-10.2)   05/25/21  06:05    


 


Phosphorus  3.30 mg/dL (2.5-4.5)   05/25/21  06:05    


 


Magnesium  2.00 mg/dL (1.7-2.3)   05/23/21  17:12    














Medications & Allergies





- Medications


Allergies/Adverse Reactions: 


                                    Allergies





sulfamethoxazole [From Bactrim] Allergy (Unverified 10/15/14 05:48)


   Shortness of Breath/HIVES


trimethoprim [From Bactrim] Allergy (Unverified 10/15/14 05:48)


   Shortness of Breath/HIVES








Home Medications: 


                                Home Medications











 Medication  Instructions  Recorded  Confirmed  Last Taken  Type


 


Apixaban [Eliquis] 5 mg PO BID 05/23/21 05/23/21 Unknown History


 


Cholecalciferol (Vitamin D3) 2,000 unit PO QDAY 05/23/21 05/23/21 Unknown 

History





[Vitamin D3 2,000 UNIT CAP]     


 


Cinacalcet HCl [Sensipar] 30 mg PO DAILY 05/23/21 05/23/21 Unknown History


 


Gabapentin [Neurontin] 600 mg PO Q8H 05/23/21 05/23/21 Unknown History


 


Insulin Aspart (Nf) [NovoLOG 1 unit SQ DAILY 05/23/21 05/23/21 Unknown History





Flexpen]     


 


Metoprolol [Lopressor] 25 mg PO DAILY 05/23/21 05/23/21 Unknown History


 


Pantoprazole Sodium [Protonix] 40 mg PO DAILY 05/23/21 05/23/21 Unknown History


 


Pregabalin [Lyrica] 75 mg PO BID 05/23/21 05/23/21 Unknown History


 


Sevelamer Carbonate [Renvela] 800 mg PO TIDWM PRN 05/23/21 05/23/21 Unknown 

History


 


Tadalafil [Cialis] 20 mg PO BID 05/23/21 05/23/21 Unknown History


 


Tramadol HCl [traMADol  MG] 1 tab PO DAILY 05/23/21 05/23/21 Unknown 

History


 


dilTIAZem HCl [Diltiazem 24Hr  mg PO DAILY 05/23/21 05/23/21 Unknown 

History





(Cd)]     











Active Medications: 














Generic Name Dose Route Start Last Admin





  Trade Name Freq  PRN Reason Stop Dose Admin


 


Acetaminophen  650 mg  05/23/21 22:33 





  Acetaminophen 325 Mg Tab  PO  





  Q4H PRN  





  Pain MILD(1-3)/Fever >100.5/HA  


 


Apixaban  5 mg  05/24/21 22:30  05/26/21 09:05





  Apixaban 5 Mg Tab  PO   5 mg





  BID JUDY   Administration


 


Cholecalciferol  2,000 unit  05/24/21 10:00  05/26/21 09:05





  Cholecalciferol (Vit D3) 1000 Unit (25 Mcg) Tab  PO   2,000 unit





  QDAY JUDY   Administration


 


Cinacalcet  30 mg  05/24/21 10:00  05/26/21 09:05





  Cinacalcet 30 Mg Tab  PO   30 mg





  DAILY JUDY   Administration


 


Dextrose  50 gm  05/23/21 18:00  05/23/21 19:30





  Dextrose 50% In Water (25gm) 50 Ml Vial  IV   50 gm





  Q30MIN PRN   Administration





  Hypoglycemia  





  Protocol  


 


Diltiazem HCl  180 mg  05/24/21 10:00  05/26/21 09:47





  Diltiazem Cd 180 Mg Cap  PO   Not Given





  DAILY JUDY  


 


Gabapentin  600 mg  05/23/21 22:30  05/26/21 06:29





  Gabapentin 300 Mg Cap  PO   600 mg





  Q8HR JUDY   Administration


 


Sodium Chloride  100 mls @ 999 mls/hr  05/24/21 10:03 





  Nacl 0.9%  IV  





  SELINA PRN  





  Hypotension  


 


Insulin Human Lispro  0 unit  05/24/21 07:30  05/26/21 09:07





  Insulin Lispro 100 Unit/Ml  SUB-Q   3 unit





  ACHS JUDY   Administration





  Protocol  


 


Metoclopramide HCl  5 mg  05/23/21 22:48 





  Metoclopramide 10 Mg/2 Ml Inj  IV  





  Q6H PRN  





  Nausea And Vomiting  


 


Metoprolol Tartrate  25 mg  05/24/21 10:00  05/26/21 09:47





  Metoprolol Tartrate 25 Mg Tab  PO   Not Given





  DAILY [facility]  


 


Morphine Sulfate  2 mg  05/23/21 22:33 





  Morphine 2 Mg/1 Ml Inj  IV  





  Q4H PRN  





  Pain, Moderate (4-6)  


 


Ondansetron HCl  4 mg  05/23/21 22:33 





  Ondansetron 4 Mg/2 Ml Inj  IV  





  Q8H PRN  





  Nausea And Vomiting  


 


Oxycodone/Acetaminophen  1 tab  05/23/21 22:33  05/25/21 21:41





  Oxycodone /Acetaminophen 5-325mg Tab  PO   1 tab





  Q6H PRN   Administration





  Pain, Moderate (4-6)  


 


Pantoprazole Sodium  40 mg  05/24/21 10:00  05/26/21 09:05





  Pantoprazole 40 Mg Tab  PO   40 mg





  DAILY JUDY   Administration


 


Pregabalin  75 mg  05/24/21 10:00  05/26/21 09:05





  Pregabalin 75 Mg Cap  PO   75 mg





  BID JUDY   Administration


 


Sevelamer Carbonate  3,200 mg  05/24/21 08:00  05/26/21 09:05





  Sevelamer Carbonate 800 Mg Tab  PO   3,200 mg





  TIDWM JUDY   Administration


 


Sodium Chloride  10 ml  05/24/21 10:00  05/25/21 21:36





  Sodium Chloride 0.9% 10 Ml Flush Syringe  IV   10 ml





  BID JUDY   Administration


 


Sodium Chloride  10 ml  05/23/21 22:33 





  Sodium Chloride 0.9% 10 Ml Flush Syringe  IV  





  PRN PRN  





  LINE FLUSH

## 2021-05-26 NOTE — PROGRESS NOTE
Assessment and Plan


Cultures:


SARS CoV2 PCR: negative


5/23/2021 blood culture: 1 out of 4 bottles with GPC in pairs and chains





A/P:


65-year-old female with hypertension, ESRD, diabetes was admitted to the 

hospital with altered mental status, low-grade fever:





#Sepsis, secondary to bilateral pneumonia.





#GPC bacteremia: source unclear. ?pneumonia v/s contaminant. Does have 

indwelling PORT.





#Acute hypoxic respiratory failure: improving





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 

Stable.





#Indwelling Port-A-Cath





#ESRD on HD: Renally dose antibiotics.





Recs:


-continue with IV vancomycin for now


-f/u blood cultures





Carlotta Mariscal MD, FACP


[facility] ([facility] ([facility])


O: [phone]


F: [phone]





Subjective


Date of service: 05/26/21


Principal diagnosis: ESRD


Interval history: 


Patient afebrile. Got hypotensive at dialysis and had nausea. But now back in 

her room, feeling better. 





Objective





- Exam


Narrative Exam: 


Physical Exam: 


Constitutional: Alert, cooperative. No acute distress


Head, Ears, Nose: Normocephalic, atraumatic. External ears, nose normal


Eyes: Conjunctivae/corneas clear. No icterus. No ptosis.


Neck: Supple, no meningeal signs


Cardiovascular: S1, S2 normal. 


Respiratory: Good air entry, clear to auscultation bilaterally


GI: Soft, non-tender; bowel sounds normal. No peritoneal signs


Musculoskeletal: No pedal edema, no cyanosis.


Skin: No rash or abscess.  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

no draining wounds.


Hem/Lymphatic: No palpable cervical or supraclavicular nodes. No lymphangitis


Psych: Mood ok. Affect normal


Neurological: Awake, alert, oriented. Hard of hearing 





- Constitutional


Vitals: 


                                   Vital Signs











Temp Pulse Resp BP Pulse Ox


 


 98.2 F   56 L  16   101/54   100 


 


 05/26/21 12:30  05/26/21 12:30  05/26/21 12:30  05/26/21 12:30  05/26/21 12:30








                           Temperature -Last 24 Hours











Temperature                    98.2 F


 


Temperature                    98.1 F


 


Temperature                    97.6 F


 


Temperature                    97.8 F


 


Temperature                    99.2 F


 


Temperature                    98 F

















- Labs


CBC & Chem 7: 


                                 05/24/21 08:38





                                 05/25/21 06:05


Labs: 


                              Abnormal lab results











  05/25/21 05/25/21 05/25/21 Range/Units





  12:21 15:49 22:41 


 


POC Glucose  227 H  163 H  236 H  ()  mg/dL














  05/26/21 Range/Units





  07:50 


 


POC Glucose  236 H  ()  mg/dL

## 2021-05-26 NOTE — PROGRESS NOTE
Assessment and Plan


Assessment and plan: 


65-year-old female patient was admitted with fever, lethargy, altered level of 

consciousness initial work-up is consistent with pneumonia on chest x-ray, end-

stage renal disease with hyperkalemia, evaluated by nephrology received 

hemodialysis last night.  This morning patient is more alert and awake resp

onding appropriately, ID nephrology consulted.  corona PCR test negative, liver 

mass/lesion on CT abdomen, MRI revealed hepatic cavernous hemangioma, GI 

consulted today.  Patient is more alert and awake responding appropriately





-- Acute toxic metabolic encephalopathy


Current Visit: Yes   Status: Acute   


Secondary to uremia and possible bacteremia


And pneumonia, improved


Patient is more alert and awake responding appropriately





-- Bilateral pneumonia


Current Visit: Yes   Status: Acute   


Possible  community-acquired pneumonia


Continue empiric antibiotics.  Vanco and cefepime


follow cultures, Procalcitonin is high





--Sepsis secondary to bilateral pneumonia;


Current Visit: Yes   Status: Acute 


Continue Vanco cefepime, follow cultures


ID following





--PUI /COVID-19 test negative


Current Visit: Yes   Status: Acute   


DC isolation





-- Liver mass, right lobe


Current Visit: Yes   Status: Acute   


Hepatic mass on CAT scan of the abdomen and CAT scan of the lung


MRI of the abdomen , hepatic cavernous hemangioma


GI evaluated, no intervention needed, stable





-- ESRD (end stage renal disease) on dialysis


Current Visit: Yes   Status: Chronic   


Received emergent hemodialysis last night


HD per schedule, nephrology following





--Hyperkalemia resolved


Current Visit: Yes   Status: Acute   


Closely monitor electrolytes


Hemodialysis per schedule





--IDDM (insulin dependent diabetes mellitus)


Current Visit: Yes   Status: Chronic   


Accu-Chek sliding scale coverage ADA diet 


Long-acting insulin as needed 


Hb A1c 6.8 , consider low-dose long-acting 70/30 insulin if needed





--Obesity; BMI 38.6


Current Visit: Yes   Status: Chronic


Patient needs diet modification exercise as tolerated and weight reduction


When medically stable


Also outpatient pulmonary evaluation to rule out KATERIN   





--Full code





--DVT prophylaxis


Current Visit: Yes   Status: Acute   


On heparin renal dose





We will closely monitor the patient and adjust management as needed


Consultant recommendations noted and appreciated


Plan of care reviewed with the patient and her nurse





5/24/2021; follow corona PCR test report


Follow consultants and recommendations





5/25/2021; MRI abdomen revealed, hepatic cavernous angioma


Consult GI, COVID-19 test negative, DC isolation





5/26/2021; continue current antibiotics.  Follow cultures


Consultant recommendations noted and appreciated














History


Interval history: 


I have seen and examined the patient at the bedside


Patient's chart and medications reviewed


And received hemodialysis today


No new complaints





Hospitalist Physical





- Constitutional


Vitals: 


                                        











Temp Pulse Resp BP Pulse Ox


 


 98.1 F   66   16   95/53   100 


 


 05/26/21 09:48  05/26/21 11:45  05/26/21 09:48  05/26/21 11:45  05/26/21 09:48











General appearance: Present: no acute distress, well-nourished, obese





- EENT


Eyes: Present: PERRL, EOM intact





- Neck


Neck: Present: supple, normal ROM





- Respiratory


Respiratory effort: normal


Respiratory: bilateral: diminished, rhonchi, negative: rales, wheezing





- Cardiovascular


Rhythm: regular


Heart Sounds: Present: S1 & S2





- Extremities


Extremities: no ischemia, No edema





- Abdominal


General gastrointestinal: soft, non-tender, non-distended, normal bowel sounds





- Integumentary


Integumentary: Present: clear, warm





- Psychiatric


Psychiatric: appropriate mood/affect, cooperative





- Neurologic


Neurologic: CNII-XII intact, moves all extremities





HEART Score





- HEART Score


Age: 45-65


Risk factors: > 3 risk factors or hx of atherosclerotic disease


Troponin: 


                                        











Troponin T  0.088 ng/mL (0.00-0.029)  H  05/23/21  17:12    














- Critical Actions


Critical Actions: 4-6 pts:12-16.6% risk of adverse cardiac event. Should be 

admitted





Results





- Labs


CBC & Chem 7: 


                                 05/24/21 08:38





                                 05/25/21 06:05


Labs: 


                             Laboratory Last Values











WBC  7.7 K/mm3 (4.5-11.0)   05/24/21  08:38    


 


RBC  4.22 M/mm3 (3.65-5.03)   05/24/21  08:38    


 


Hgb  12.8 gm/dl (10.1-14.3)   05/24/21  08:38    


 


Hct  39.3 % (30.3-42.9)   05/24/21  08:38    


 


MCV  93 fl (79-97)   05/24/21  08:38    


 


MCH  30 pg (28-32)   05/24/21  08:38    


 


MCHC  32 % (30-34)   05/24/21  08:38    


 


RDW  18.7 % (13.2-15.2)  H  05/24/21  08:38    


 


Plt Count  109 K/mm3 (140-440)  L  05/24/21  08:38    


 


Lymph % (Auto)  Np   05/24/21  08:38    


 


Mono % (Auto)  Np   05/24/21  08:38    


 


Eos % (Auto)  Np   05/24/21  08:38    


 


Baso % (Auto)  Np   05/24/21  08:38    


 


Lymph # (Auto)  Np   05/24/21  08:38    


 


Mono # (Auto)  Np   05/24/21  08:38    


 


Eos # (Auto)  Np   05/24/21  08:38    


 


Baso # (Auto)  Np   05/24/21  08:38    


 


Seg Neutrophils %  Np   05/24/21  08:38    


 


Seg Neutrophils #  Np   05/24/21  08:38    


 


PT  16.2 Sec. (12.2-14.9)  H  05/23/21  17:12    


 


INR  1.32  (0.87-1.13)  H  05/23/21  17:12    


 


APTT  33.7 Sec. (24.2-36.6)   05/23/21  17:12    


 


D-Dimer  408.28 ng/mlDDU (0-234)  H  05/23/21  17:12    


 


Sodium  134 mmol/L (137-145)  L  05/25/21  06:05    


 


Potassium  4.6 mmol/L (3.6-5.0)   05/25/21  06:05    


 


Chloride  91.7 mmol/L ()  L  05/25/21  06:05    


 


Carbon Dioxide  33 mmol/L (22-30)  H  05/25/21  06:05    


 


Anion Gap  14 mmol/L  05/25/21  06:05    


 


BUN  26 mg/dL (7-17)  H  05/25/21  06:05    


 


Creatinine  5.0 mg/dL (0.6-1.2)  H  05/25/21  06:05    


 


Estimated GFR  11 ml/min  05/25/21  06:05    


 


BUN/Creatinine Ratio  5 %  05/25/21  06:05    


 


Glucose  216 mg/dL ()  H  05/25/21  06:05    


 


POC Glucose  236 mg/dL ()  H  05/26/21  07:50    


 


Hemoglobin A1c  6.8 % (4-6)  H  05/24/21  08:38    


 


Lactic Acid  1.70 mmol/L (0.7-2.0)   05/23/21  17:12    


 


Calcium  8.4 mg/dL (8.4-10.2)   05/25/21  06:05    


 


Phosphorus  3.30 mg/dL (2.5-4.5)   05/25/21  06:05    


 


Magnesium  2.00 mg/dL (1.7-2.3)   05/23/21  17:12    


 


Ferritin  203.6 ng/mL (10.0-200.0)  H  05/23/21  17:12    


 


Total Bilirubin  0.70 mg/dL (0.1-1.2)   05/24/21  08:38    


 


AST  11 units/L (5-40)   05/24/21  08:38    


 


ALT  7 units/L (7-56)   05/24/21  08:38    


 


Alkaline Phosphatase  143 units/L ()  H  05/24/21  08:38    


 


Ammonia  20.0 umol/L (25-60)  L  05/23/21  17:12    


 


Lactate Dehydrogenase  219 units/L ()  H  05/23/21  17:12    


 


Total Creatine Kinase  57 units/L ()   05/23/21  17:12    


 


Troponin T  0.088 ng/mL (0.00-0.029)  H  05/23/21  17:12    


 


C-Reactive Protein  5.00 mg/dL (0.00-1.30)  H  05/23/21  17:12    


 


Total Protein  7.1 g/dL (6.3-8.2)   05/24/21  08:38    


 


Albumin  3.5 g/dL (3.9-5)  L  05/24/21  08:38    


 


Albumin/Globulin Ratio  1.0 %  05/24/21  08:38    


 


Triglycerides  104 mg/dL (2-149)   05/23/21  17:12    


 


Cholesterol  158 mg/dL ()   05/23/21  17:12    


 


LDL Cholesterol Direct  90 mg/dL ()   05/23/21  17:12    


 


HDL Cholesterol  60 mg/dL (40-59)  H  05/23/21  17:12    


 


Cholesterol/HDL Ratio  2.63 %  05/23/21  17:12    


 


Procalcitonin  8.81 ng/mL (<0.15)   05/23/21  17:12    


 


TSH  0.696 mlU/mL (0.270-4.200)   05/23/21  17:12    


 


Random Vancomycin  24.2 ug/mL (0-40.0)   05/26/21  05:20    


 


Salicylates  < 0.3 mg/dL (2.8-20.0)  L  05/23/21  17:12    


 


Acetaminophen  5.0 ug/mL (10.0-30.0)  L  05/23/21  17:12    


 


Plasma/Serum Alcohol  < 0.01 % (0-0.07)   05/23/21  17:12    


 


Coronavirus (PCR)  Negative  (Negative)   05/23/21  09:10    


 


Hepatitis A IgM Ab  Non-reactive  (NonReactive)   05/23/21  20:52    


 


Hep Bs Antigen  Non-reactive  (Negative)   05/23/21  20:52    


 


Hep B Core IgM Ab  Non-reactive  (NonReactive)   05/23/21  20:52    


 


Hepatitis C Antibody  Non-reactive  (NonReactive)   05/23/21  20:52    


 


Blood Type  O POSITIVE   05/23/21  17:12    


 


Antibody Screen  Negative   05/23/21  17:12    











Microbiology: 


Microbiology





05/23/21 16:58   Peripheral/Venous   Blood Culture - Preliminary


                            NO GROWTH AFTER 48 HOURS








Cunha/IV: 


                                        





Voiding Method                   Toilet











Active Medications





- Current Medications


Current Medications: 














Generic Name Dose Route Start Last Admin





  Trade Name Freq  PRN Reason Stop Dose Admin


 


Acetaminophen  650 mg  05/23/21 22:33 





  Acetaminophen 325 Mg Tab  PO  





  Q4H PRN  





  Pain MILD(1-3)/Fever >100.5/HA  


 


Apixaban  5 mg  05/24/21 22:30  05/26/21 09:05





  Apixaban 5 Mg Tab  PO   5 mg





  BID JUDY   Administration


 


Cholecalciferol  2,000 unit  05/24/21 10:00  05/26/21 09:05





  Cholecalciferol (Vit D3) 1000 Unit (25 Mcg) Tab  PO   2,000 unit





  QDAY JUDY   Administration


 


Cinacalcet  30 mg  05/24/21 10:00  05/26/21 09:05





  Cinacalcet 30 Mg Tab  PO   30 mg





  DAILY JUDY   Administration


 


Dextrose  50 gm  05/23/21 18:00  05/23/21 19:30





  Dextrose 50% In Water (25gm) 50 Ml Vial  IV   50 gm





  Q30MIN PRN   Administration





  Hypoglycemia  





  Protocol  


 


Diltiazem HCl  180 mg  05/24/21 10:00  05/26/21 09:47





  Diltiazem Cd 180 Mg Cap  PO   Not Given





  DAILY JUDY  


 


Gabapentin  600 mg  05/23/21 22:30  05/26/21 06:29





  Gabapentin 300 Mg Cap  PO   600 mg





  Q8HR JUDY   Administration


 


Sodium Chloride  100 mls @ 999 mls/hr  05/24/21 10:03 





  Nacl 0.9%  IV  





  SELINA PRN  





  Hypotension  


 


Insulin Human Lispro  0 unit  05/24/21 07:30  05/26/21 09:07





  Insulin Lispro 100 Unit/Ml  SUB-Q   3 unit





  ACHS JUDY   Administration





  Protocol  


 


Metoclopramide HCl  5 mg  05/23/21 22:48 





  Metoclopramide 10 Mg/2 Ml Inj  IV  





  Q6H PRN  





  Nausea And Vomiting  


 


Metoprolol Tartrate  25 mg  05/24/21 10:00  05/26/21 09:47





  Metoprolol Tartrate 25 Mg Tab  PO   Not Given





  DAILY [facility]  


 


Morphine Sulfate  2 mg  05/23/21 22:33 





  Morphine 2 Mg/1 Ml Inj  IV  





  Q4H PRN  





  Pain, Moderate (4-6)  


 


Ondansetron HCl  4 mg  05/23/21 22:33 





  Ondansetron 4 Mg/2 Ml Inj  IV  





  Q8H PRN  





  Nausea And Vomiting  


 


Oxycodone/Acetaminophen  1 tab  05/23/21 22:33  05/25/21 21:41





  Oxycodone /Acetaminophen 5-325mg Tab  PO   1 tab





  Q6H PRN   Administration





  Pain, Moderate (4-6)  


 


Pantoprazole Sodium  40 mg  05/24/21 10:00  05/26/21 09:05





  Pantoprazole 40 Mg Tab  PO   40 mg





  DAILY JUDY   Administration


 


Pregabalin  75 mg  05/24/21 10:00  05/26/21 09:05





  Pregabalin 75 Mg Cap  PO   75 mg





  BID JUDY   Administration


 


Sevelamer Carbonate  3,200 mg  05/24/21 08:00  05/26/21 09:05





  Sevelamer Carbonate 800 Mg Tab  PO   3,200 mg





  TIDWM JUDY   Administration


 


Sodium Chloride  10 ml  05/24/21 10:00  05/25/21 21:36





  Sodium Chloride 0.9% 10 Ml Flush Syringe  IV   10 ml





  BID JUDY   Administration


 


Sodium Chloride  10 ml  05/23/21 22:33 





  Sodium Chloride 0.9% 10 Ml Flush Syringe  IV  





  PRN PRN  





  LINE FLUSH  














Nutrition/Malnutrition Assess





- Dietary Evaluation


Nutrition/Malnutrition Findings: 


                                        





Nutrition Notes                                            Start:  05/26/21 

09:48


Freq:                                                      Status: Active       




Protocol:                                                                       




 Document     05/26/21 09:48    (Rec: 05/26/21 09:48    [id number])


 Nutrition Notes


     Initial or Follow up                        Brief Note


     Subjective/Other Information                RN screen for skin risk.


                                                 Delio score 18. Pt with no


                                                 wounds. Pt has calciphylaxis


                                                 on LE likely due to ESRD. Pt


                                                 eating 100% of meals.


 Nutrition Intervention


     Revisit per MD consult or patient           Sign Off


      request:

## 2021-05-27 RX ADMIN — PREGABALIN SCH MG: 75 CAPSULE ORAL at 22:32

## 2021-05-27 RX ADMIN — GABAPENTIN SCH MG: 300 CAPSULE ORAL at 22:31

## 2021-05-27 RX ADMIN — GABAPENTIN SCH: 300 CAPSULE ORAL at 06:03

## 2021-05-27 RX ADMIN — INSULIN LISPRO SCH UNIT: 100 INJECTION, SOLUTION INTRAVENOUS; SUBCUTANEOUS at 22:33

## 2021-05-27 RX ADMIN — METOPROLOL TARTRATE SCH: 25 TABLET, FILM COATED ORAL at 10:00

## 2021-05-27 RX ADMIN — APIXABAN SCH MG: 5 TABLET, FILM COATED ORAL at 22:31

## 2021-05-27 RX ADMIN — Medication SCH ML: at 09:35

## 2021-05-27 RX ADMIN — INSULIN HUMAN SCH UNIT: 100 INJECTION, SUSPENSION SUBCUTANEOUS at 17:03

## 2021-05-27 RX ADMIN — OXYCODONE AND ACETAMINOPHEN PRN TAB: 5; 325 TABLET ORAL at 13:30

## 2021-05-27 RX ADMIN — PREGABALIN SCH MG: 75 CAPSULE ORAL at 09:34

## 2021-05-27 RX ADMIN — INSULIN LISPRO SCH UNIT: 100 INJECTION, SOLUTION INTRAVENOUS; SUBCUTANEOUS at 13:29

## 2021-05-27 RX ADMIN — GABAPENTIN SCH MG: 300 CAPSULE ORAL at 13:29

## 2021-05-27 RX ADMIN — INSULIN LISPRO SCH UNIT: 100 INJECTION, SOLUTION INTRAVENOUS; SUBCUTANEOUS at 17:03

## 2021-05-27 RX ADMIN — Medication SCH ML: at 22:32

## 2021-05-27 RX ADMIN — SEVELAMER CARBONATE SCH MG: 800 TABLET, FILM COATED ORAL at 13:28

## 2021-05-27 RX ADMIN — INSULIN HUMAN SCH UNIT: 100 INJECTION, SUSPENSION SUBCUTANEOUS at 08:40

## 2021-05-27 RX ADMIN — PANTOPRAZOLE SODIUM SCH MG: 40 TABLET, DELAYED RELEASE ORAL at 09:34

## 2021-05-27 RX ADMIN — Medication SCH UNIT: at 09:34

## 2021-05-27 RX ADMIN — INSULIN LISPRO SCH: 100 INJECTION, SOLUTION INTRAVENOUS; SUBCUTANEOUS at 07:57

## 2021-05-27 RX ADMIN — SEVELAMER CARBONATE SCH MG: 800 TABLET, FILM COATED ORAL at 17:02

## 2021-05-27 RX ADMIN — APIXABAN SCH MG: 5 TABLET, FILM COATED ORAL at 09:34

## 2021-05-27 RX ADMIN — SEVELAMER CARBONATE SCH MG: 800 TABLET, FILM COATED ORAL at 10:27

## 2021-05-27 RX ADMIN — CINACALCET HYDROCHLORIDE SCH MG: 30 TABLET, FILM COATED ORAL at 09:34

## 2021-05-27 NOTE — PROGRESS NOTE
Assessment and Plan


Assessment and plan: 


65-year-old female patient was admitted with fever, lethargy, altered level of 

consciousness initial work-up is consistent with pneumonia on chest x-ray, end-

stage renal disease with hyperkalemia, evaluated by nephrology received 

hemodialysis last night.  This morning patient is more alert and awake resp

onding appropriately, ID nephrology consulted.  corona PCR test negative, liver 

mass/lesion on CT abdomen, MRI revealed hepatic cavernous hemangioma, GI 

consulted today.  Patient is more alert and awake responding appropriately








--IDDM (insulin dependent diabetes mellitus) A1c 6.8


Current Visit: Yes   Status: Chronic   


Uncontrolled , Accu-Chek sliding scale coverage ADA diet 


Add long-acting insulin 70/30 Novolin 5 units twice a day





-- Acute toxic metabolic encephalopathy


Current Visit: Yes   Status: Acute   


Secondary to uremia and possible bacteremia


And pneumonia, improved


Patient is more alert and awake responding appropriately





-- Bilateral pneumonia POA


Current Visit: Yes   Status: Acute   


Possible  community-acquired pneumonia


Continue empiric antibiotics.  Vanco and cefepime


follow cultures, Procalcitonin is high





--Sepsis secondary to bilateral pneumonia; POA


Current Visit: Yes   Status: Acute 


Continue Vanco cefepime, follow cultures


ID following





--PUI /COVID-19 test negative


Current Visit: Yes   Status: Acute   


DC isolation





-- Liver mass, right lobe


Current Visit: Yes   Status: Acute   


Hepatic mass on CAT scan of the abdomen and CAT scan of the lung


MRI of the abdomen , hepatic cavernous hemangioma


GI evaluated, no intervention needed, stable





-- ESRD (end stage renal disease) on dialysis


Current Visit: Yes   Status: Chronic   


Received emergent hemodialysis on admission


HD per schedule, nephrology following





--Hyperkalemia resolved


Current Visit: Yes   Status: Acute   


Closely monitor electrolytes


Hemodialysis per schedule








--Morbid obesity; BMI 40.0


Current Visit: Yes   Status: Chronic


Patient needs diet modification exercise as tolerated and weight reduction


When medically stable


Also outpatient pulmonary evaluation to rule out KATERIN   





--Full code





--DVT prophylaxis


Current Visit: Yes   Status: Acute   


On heparin renal dose





We will closely monitor the patient and adjust management as needed


Consultant recommendations noted and appreciated


Plan of care reviewed with the patient and her nurse





5/24/2021; follow corona PCR test report


Follow consultants and recommendations





5/25/2021; MRI abdomen revealed, hepatic cavernous angioma


Consult GI, COVID-19 test negative, DC isolation





5/26/2021; continue current antibiotics.  Follow cultures


Consultant recommendations noted and appreciated





5/27/2021; patient continues to be on Vanco and cefepime


Blood sugars uncontrolled, add Novolin 70/30 5 units twice a day


Adjust as needed, advised diabetic education, nutrition education














History


Interval history: 


I have seen and examined the patient at the bedside today


Patient feels slightly better, no new complaints


Receiving IV antibiotics for pneumonia


Vital signs reviewed








Hospitalist Physical





- Constitutional


Vitals: 


                                        











Temp Pulse Resp BP Pulse Ox


 


 97.7 F   60   16   96/61   99 


 


 05/27/21 04:14  05/27/21 04:14  05/27/21 04:14  05/27/21 04:14  05/27/21 04:14











General appearance: Present: no acute distress, well-nourished, obese (Morbidly)





- EENT


Eyes: Present: PERRL, EOM intact





- Neck


Neck: Present: supple, normal ROM





- Respiratory


Respiratory effort: normal


Respiratory: bilateral: diminished, negative: rales, rhonchi, wheezing





- Cardiovascular


Rhythm: regular


Heart Sounds: Present: S1 & S2





- Extremities


Extremities: no ischemia, No edema





- Abdominal


General gastrointestinal: soft, non-tender, non-distended, normal bowel sounds





- Integumentary


Integumentary: Present: clear, warm





- Psychiatric


Psychiatric: appropriate mood/affect, cooperative





- Neurologic


Neurologic: moves all extremities





HEART Score





- HEART Score


Age: 45-65


Risk factors: > 3 risk factors or hx of atherosclerotic disease


Troponin: 


                                        











Troponin T  0.088 ng/mL (0.00-0.029)  H  05/23/21  17:12    














- Critical Actions


Critical Actions: 4-6 pts:12-16.6% risk of adverse cardiac event. Should be 

admitted





Results





- Labs


CBC & Chem 7: 


                                 05/24/21 08:38





                                 05/25/21 06:05


Labs: 


                             Laboratory Last Values











WBC  7.7 K/mm3 (4.5-11.0)   05/24/21  08:38    


 


RBC  4.22 M/mm3 (3.65-5.03)   05/24/21  08:38    


 


Hgb  12.8 gm/dl (10.1-14.3)   05/24/21  08:38    


 


Hct  39.3 % (30.3-42.9)   05/24/21  08:38    


 


MCV  93 fl (79-97)   05/24/21  08:38    


 


MCH  30 pg (28-32)   05/24/21  08:38    


 


MCHC  32 % (30-34)   05/24/21  08:38    


 


RDW  18.7 % (13.2-15.2)  H  05/24/21  08:38    


 


Plt Count  109 K/mm3 (140-440)  L  05/24/21  08:38    


 


Lymph % (Auto)  Np   05/24/21  08:38    


 


Mono % (Auto)  Np   05/24/21  08:38    


 


Eos % (Auto)  Np   05/24/21  08:38    


 


Baso % (Auto)  Np   05/24/21  08:38    


 


Lymph # (Auto)  Np   05/24/21  08:38    


 


Mono # (Auto)  Np   05/24/21  08:38    


 


Eos # (Auto)  Np   05/24/21  08:38    


 


Baso # (Auto)  Np   05/24/21  08:38    


 


Seg Neutrophils %  Np   05/24/21  08:38    


 


Seg Neutrophils #  Np   05/24/21  08:38    


 


PT  16.2 Sec. (12.2-14.9)  H  05/23/21  17:12    


 


INR  1.32  (0.87-1.13)  H  05/23/21  17:12    


 


APTT  33.7 Sec. (24.2-36.6)   05/23/21  17:12    


 


D-Dimer  408.28 ng/mlDDU (0-234)  H  05/23/21  17:12    


 


Sodium  134 mmol/L (137-145)  L  05/25/21  06:05    


 


Potassium  4.6 mmol/L (3.6-5.0)   05/25/21  06:05    


 


Chloride  91.7 mmol/L ()  L  05/25/21  06:05    


 


Carbon Dioxide  33 mmol/L (22-30)  H  05/25/21  06:05    


 


Anion Gap  14 mmol/L  05/25/21  06:05    


 


BUN  26 mg/dL (7-17)  H  05/25/21  06:05    


 


Creatinine  5.0 mg/dL (0.6-1.2)  H  05/25/21  06:05    


 


Estimated GFR  11 ml/min  05/25/21  06:05    


 


BUN/Creatinine Ratio  5 %  05/25/21  06:05    


 


Glucose  216 mg/dL ()  H  05/25/21  06:05    


 


POC Glucose  145 mg/dL ()  H  05/27/21  07:31    


 


Hemoglobin A1c  6.8 % (4-6)  H  05/24/21  08:38    


 


Lactic Acid  1.70 mmol/L (0.7-2.0)   05/23/21  17:12    


 


Calcium  8.4 mg/dL (8.4-10.2)   05/25/21  06:05    


 


Phosphorus  3.30 mg/dL (2.5-4.5)   05/25/21  06:05    


 


Magnesium  2.00 mg/dL (1.7-2.3)   05/23/21  17:12    


 


Ferritin  203.6 ng/mL (10.0-200.0)  H  05/23/21  17:12    


 


Total Bilirubin  0.70 mg/dL (0.1-1.2)   05/24/21  08:38    


 


AST  11 units/L (5-40)   05/24/21  08:38    


 


ALT  7 units/L (7-56)   05/24/21  08:38    


 


Alkaline Phosphatase  143 units/L ()  H  05/24/21  08:38    


 


Ammonia  20.0 umol/L (25-60)  L  05/23/21  17:12    


 


Lactate Dehydrogenase  219 units/L ()  H  05/23/21  17:12    


 


Total Creatine Kinase  57 units/L ()   05/23/21  17:12    


 


Troponin T  0.088 ng/mL (0.00-0.029)  H  05/23/21  17:12    


 


C-Reactive Protein  5.00 mg/dL (0.00-1.30)  H  05/23/21  17:12    


 


Total Protein  7.1 g/dL (6.3-8.2)   05/24/21  08:38    


 


Albumin  3.5 g/dL (3.9-5)  L  05/24/21  08:38    


 


Albumin/Globulin Ratio  1.0 %  05/24/21  08:38    


 


Triglycerides  104 mg/dL (2-149)   05/23/21  17:12    


 


Cholesterol  158 mg/dL ()   05/23/21  17:12    


 


LDL Cholesterol Direct  90 mg/dL ()   05/23/21  17:12    


 


HDL Cholesterol  60 mg/dL (40-59)  H  05/23/21  17:12    


 


Cholesterol/HDL Ratio  2.63 %  05/23/21  17:12    


 


Procalcitonin  8.81 ng/mL (<0.15)   05/23/21  17:12    


 


TSH  0.696 mlU/mL (0.270-4.200)   05/23/21  17:12    


 


Random Vancomycin  24.2 ug/mL (0-40.0)   05/26/21  05:20    


 


Salicylates  < 0.3 mg/dL (2.8-20.0)  L  05/23/21  17:12    


 


Acetaminophen  5.0 ug/mL (10.0-30.0)  L  05/23/21  17:12    


 


Plasma/Serum Alcohol  < 0.01 % (0-0.07)   05/23/21  17:12    


 


Coronavirus (PCR)  Negative  (Negative)   05/23/21  09:10    


 


Hepatitis A IgM Ab  Non-reactive  (NonReactive)   05/23/21  20:52    


 


Hep Bs Antigen  Non-reactive  (Negative)   05/23/21  20:52    


 


Hep B Core IgM Ab  Non-reactive  (NonReactive)   05/23/21  20:52    


 


Hepatitis C Antibody  Non-reactive  (NonReactive)   05/23/21  20:52    


 


Blood Type  O POSITIVE   05/23/21  17:12    


 


Antibody Screen  Negative   05/23/21  17:12    











Microbiology: 


Microbiology





05/23/21 16:58   Peripheral/Venous   Blood Culture - Preliminary


                            NO GROWTH AFTER 72 HOURS








Cunha/IV: 


                                        





Voiding Method                   Toilet











Active Medications





- Current Medications


Current Medications: 














Generic Name Dose Route Start Last Admin





  Trade Name Freq  PRN Reason Stop Dose Admin


 


Acetaminophen  650 mg  05/23/21 22:33 





  Acetaminophen 325 Mg Tab  PO  





  Q4H PRN  





  Pain MILD(1-3)/Fever >100.5/HA  


 


Apixaban  5 mg  05/24/21 22:30  05/26/21 22:05





  Apixaban 5 Mg Tab  PO   5 mg





  BID JUDY   Administration


 


Cholecalciferol  2,000 unit  05/24/21 10:00  05/26/21 09:05





  Cholecalciferol (Vit D3) 1000 Unit (25 Mcg) Tab  PO   2,000 unit





  QDAY JUDY   Administration


 


Cinacalcet  30 mg  05/24/21 10:00  05/26/21 09:05





  Cinacalcet 30 Mg Tab  PO   30 mg





  DAILY JUDY   Administration


 


Dextrose  50 gm  05/23/21 18:00  05/23/21 19:30





  Dextrose 50% In Water (25gm) 50 Ml Vial  IV   50 gm





  Q30MIN PRN   Administration





  Hypoglycemia  





  Protocol  


 


Diltiazem HCl  180 mg  05/24/21 10:00  05/26/21 09:47





  Diltiazem Cd 180 Mg Cap  PO   Not Given





  DAILY [facility]  


 


Gabapentin  600 mg  05/23/21 22:30  05/27/21 06:03





  Gabapentin 300 Mg Cap  PO   Not Given





  Q8HR JUDY  


 


Sodium Chloride  100 mls @ 999 mls/hr  05/24/21 10:03 





  Nacl 0.9%  IV  





  SELINA PRN  





  Hypotension  


 


Insulin Human Lispro  0 unit  05/24/21 07:30  05/26/21 22:07





  Insulin Lispro 100 Unit/Ml  SUB-Q   6 unit





  ACHS JUDY   Administration





  Protocol  


 


Metoclopramide HCl  5 mg  05/23/21 22:48 





  Metoclopramide 10 Mg/2 Ml Inj  IV  





  Q6H PRN  





  Nausea And Vomiting  


 


Metoprolol Tartrate  25 mg  05/24/21 10:00  05/26/21 09:47





  Metoprolol Tartrate 25 Mg Tab  PO   Not Given





  DAILY [facility]  


 


Morphine Sulfate  2 mg  05/23/21 22:33 





  Morphine 2 Mg/1 Ml Inj  IV  





  Q4H PRN  





  Pain, Moderate (4-6)  


 


Ondansetron HCl  4 mg  05/23/21 22:33 





  Ondansetron 4 Mg/2 Ml Inj  IV  





  Q8H PRN  





  Nausea And Vomiting  


 


Oxycodone/Acetaminophen  1 tab  05/23/21 22:33  05/26/21 22:06





  Oxycodone /Acetaminophen 5-325mg Tab  PO   1 tab





  Q6H PRN   Administration





  Pain, Moderate (4-6)  


 


Pantoprazole Sodium  40 mg  05/24/21 10:00  05/26/21 09:05





  Pantoprazole 40 Mg Tab  PO   40 mg





  DAILY JUDY   Administration


 


Pregabalin  75 mg  05/24/21 10:00  05/26/21 22:06





  Pregabalin 75 Mg Cap  PO   75 mg





  BID JUDY   Administration


 


Sevelamer Carbonate  3,200 mg  05/24/21 08:00  05/26/21 17:40





  Sevelamer Carbonate 800 Mg Tab  PO   3,200 mg





  TIDWM JUDY   Administration


 


Sodium Chloride  10 ml  05/24/21 10:00  05/26/21 22:07





  Sodium Chloride 0.9% 10 Ml Flush Syringe  IV   10 ml





  BID JUDY   Administration


 


Sodium Chloride  10 ml  05/23/21 22:33 





  Sodium Chloride 0.9% 10 Ml Flush Syringe  IV  





  PRN PRN  





  LINE FLUSH  














Nutrition/Malnutrition Assess





- Dietary Evaluation


Nutrition/Malnutrition Findings: 


                                        





Nutrition Notes                                            Start:  05/26/21 

09:48


Freq:                                                      Status: Active       




Protocol:                                                                       




 Document     05/26/21 09:48    (Rec: 05/26/21 09:48    [id number])


 Nutrition Notes


     Initial or Follow up                        Brief Note


     Subjective/Other Information                RN screen for skin risk.


                                                 Delio score 18. Pt with no


                                                 wounds. Pt has calciphylaxis


                                                 on LE likely due to ESRD. Pt


                                                 eating 100% of meals.


 Nutrition Intervention


     Revisit per MD consult or patient           Sign Off


      request:

## 2021-05-27 NOTE — PROGRESS NOTE
Assessment and Plan








Assessment:


Altered Mental Status


Pneumonia


Hyperkalemia


Large Right Hepatic lobe lesion


End Stage Renal Disease


Hypertension, now Hypotensive


Diabetes Mellitus


PVD








Plan:


-S/P hemodialysis yesterday for UF and clearance


-Fluid restriction of 1 liter per day


-Hold BP meds if Hypotensive


-IV Albumin prn as needed with HD


-Obtain daily weights


-Monitor I/O's daily


-Assess dialysis needs daily 





Subjective


Date of service: 05/27/21


Principal diagnosis: ESRD


Interval history: 





Patient resting in bed.





Objective





- Vital Signs


Vital signs: 


                               Vital Signs - 12hr











  05/27/21 05/27/21





  04:14 09:15


 


Temperature 97.7 F 


 


Pulse Rate 60 


 


Respiratory 16 





Rate  


 


Blood Pressure 96/61 


 


O2 Sat by Pulse 99 96





Oximetry  














- General Appearance


General appearance: well-developed, appears stated age


EENT: ATNC, PERRL, other (hard of hearing)


Neck: no JVD


Respiratory: Present: Decreased Breath Sounds


Cardiology: S1S2


Gastrointestinal: normoactive bowel sounds


Integumentary: warm and dry


Neurologic: other (Awake and alert)


Musculoskeletal: other (positive edema, chronic PVD)





- Lab





                                 05/24/21 08:38





                                 05/25/21 06:05


                             Most recent lab results











Calcium  8.4 mg/dL (8.4-10.2)   05/25/21  06:05    


 


Phosphorus  3.30 mg/dL (2.5-4.5)   05/25/21  06:05    


 


Magnesium  2.00 mg/dL (1.7-2.3)   05/23/21  17:12    














Medications & Allergies





- Medications


Allergies/Adverse Reactions: 


                                    Allergies





sulfamethoxazole [From Bactrim] Allergy (Unverified 10/15/14 05:48)


   Shortness of Breath/HIVES


trimethoprim [From Bactrim] Allergy (Unverified 10/15/14 05:48)


   Shortness of Breath/HIVES








Home Medications: 


                                Home Medications











 Medication  Instructions  Recorded  Confirmed  Last Taken  Type


 


Apixaban [Eliquis] 5 mg PO BID 05/23/21 05/27/21 05/21/21 22:00 History


 


Cholecalciferol (Vitamin D3) 2,000 unit PO QDAY 05/23/21 05/27/21 05/21/21 10:00

 History





[Vitamin D3 2,000 UNIT CAP]     


 


Cinacalcet HCl [Sensipar] 30 mg PO DAILY 05/23/21 05/27/21 05/21/21 10:00 

History


 


Gabapentin [Neurontin] 600 mg PO Q8H 05/23/21 05/27/21 05/21/21 21:00 History


 


Insulin Aspart (Nf) [NovoLOG 1 unit SQ DAILY 05/23/21 05/27/21 05/21/21 08:00 

History





Flexpen]     


 


Metoprolol [Lopressor] 25 mg PO DAILY 05/23/21 05/27/21 05/21/21 10:00 History


 


Pantoprazole Sodium [Protonix] 40 mg PO DAILY 05/23/21 05/27/21 05/21/21 10:00 

History


 


Pregabalin [Lyrica] 75 mg PO BID 05/23/21 05/27/21 05/21/21 21:00 History


 


Sevelamer Carbonate [Renvela] 800 mg PO TIDWM PRN 05/23/21 05/27/21 05/21/21 

17:00 History


 


Tadalafil [Cialis] 20 mg PO BID 05/23/21 05/27/21 05/21/21 10:00 History


 


Tramadol HCl [traMADol  MG] 1 tab PO DAILY 05/23/21 05/27/21 05/21/21 

10:00 History


 


dilTIAZem HCl [Diltiazem 24Hr  mg PO DAILY 05/23/21 05/27/21 05/21/21 

08:00 History





(Cd)]     











Active Medications: 














Generic Name Dose Route Start Last Admin





  Trade Name Freq  PRN Reason Stop Dose Admin


 


Acetaminophen  650 mg  05/23/21 22:33 





  Acetaminophen 325 Mg Tab  PO  





  Q4H PRN  





  Pain MILD(1-3)/Fever >100.5/HA  


 


Apixaban  5 mg  05/24/21 22:30  05/27/21 09:34





  Apixaban 5 Mg Tab  PO   5 mg





  BID JUDY   Administration


 


Cholecalciferol  2,000 unit  05/24/21 10:00  05/27/21 09:34





  Cholecalciferol (Vit D3) 1000 Unit (25 Mcg) Tab  PO   2,000 unit





  QDAY JUDY   Administration


 


Cinacalcet  30 mg  05/24/21 10:00  05/27/21 09:34





  Cinacalcet 30 Mg Tab  PO   30 mg





  DAILY JUDY   Administration


 


Dextrose  50 gm  05/23/21 18:00  05/23/21 19:30





  Dextrose 50% In Water (25gm) 50 Ml Vial  IV   50 gm





  Q30MIN PRN   Administration





  Hypoglycemia  





  Protocol  


 


Diltiazem HCl  180 mg  05/24/21 10:00  05/26/21 09:47





  Diltiazem Cd 180 Mg Cap  PO   Not Given





  DAILY [facility]  


 


Gabapentin  600 mg  05/23/21 22:30  05/27/21 06:03





  Gabapentin 300 Mg Cap  PO   Not Given





  Q8HR [facility]  


 


Sodium Chloride  100 mls @ 999 mls/hr  05/24/21 10:03 





  Nacl 0.9%  IV  





  SELINA PRN  





  Hypotension  


 


Vancomycin HCl  1 gm in 250 mls @ 167.007 mls/hr  05/28/21 18:00 





  Vancomycin/Ns 1 Gm/250 Ml  IV  





  [id number] [facility]  


 


Insulin Human Isoph/Insulin Regular  5 unit  05/27/21 08:00  05/27/21 08:40





  Insulin Nph/Regular 70/30 Inj  SUB-Q   5 unit





  BIDDIAB [facility]   Administration


 


Insulin Human Lispro  0 unit  05/24/21 07:30  05/27/21 07:57





  Insulin Lispro 100 Unit/Ml  SUB-Q   Not Given





  ACHS [facility]  





  Protocol  


 


Metoclopramide HCl  5 mg  05/23/21 22:48 





  Metoclopramide 10 Mg/2 Ml Inj  IV  





  Q6H PRN  





  Nausea And Vomiting  


 


Metoprolol Tartrate  25 mg  05/24/21 10:00  05/26/21 09:47





  Metoprolol Tartrate 25 Mg Tab  PO   Not Given





  DAILY [facility]  


 


Morphine Sulfate  2 mg  05/23/21 22:33 





  Morphine 2 Mg/1 Ml Inj  IV  





  Q4H PRN  





  Pain, Moderate (4-6)  


 


Ondansetron HCl  4 mg  05/23/21 22:33 





  Ondansetron 4 Mg/2 Ml Inj  IV  





  Q8H PRN  





  Nausea And Vomiting  


 


Oxycodone/Acetaminophen  1 tab  05/23/21 22:33  05/26/21 22:06





  Oxycodone /Acetaminophen 5-325mg Tab  PO   1 tab





  Q6H PRN   Administration





  Pain, Moderate (4-6)  


 


Pantoprazole Sodium  40 mg  05/24/21 10:00  05/27/21 09:34





  Pantoprazole 40 Mg Tab  PO   40 mg





  DAILY [facility]   Administration


 


Pregabalin  75 mg  05/24/21 10:00  05/27/21 09:34





  Pregabalin 75 Mg Cap  PO   75 mg





  BID JUDY   Administration


 


Sevelamer Carbonate  3,200 mg  05/24/21 08:00  05/27/21 10:27





  Sevelamer Carbonate 800 Mg Tab  PO   3,200 mg





  TIDWM JUDY   Administration


 


Sodium Chloride  10 ml  05/24/21 10:00  05/27/21 09:35





  Sodium Chloride 0.9% 10 Ml Flush Syringe  IV   10 ml





  BID JUDY   Administration


 


Sodium Chloride  10 ml  05/23/21 22:33 





  Sodium Chloride 0.9% 10 Ml Flush Syringe  IV  





  PRN PRN  





  LINE FLUSH

## 2021-05-27 NOTE — PROGRESS NOTE
Assessment and Plan


Cultures:


SARS CoV2 PCR: negative


5/23/2021 blood culture: 1 out of 4 bottles with GPC in pairs and chains





A/P:


65-year-old female with hypertension, ESRD, diabetes was admitted to the 

hospital with altered mental status, fever:





#Sepsis, secondary to bilateral pneumonia. Clinically feeling back to normal. 





#GPC bacteremia: source unclear. ?pneumonia v/s contaminant. Does have 

indwelling PORT.





#Acute hypoxic respiratory failure: improving





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 

Stable.





#Indwelling Port-A-Cath





#ESRD on HD: Renally dose antibiotics.





Recs:


-continue with IV vancomycin for now


-f/u blood cultures, still not speciated. Called microbiology lab today, 

currently no tech available to process the blood culture further


-repeat blood cultures ordered





Carlotta Mariscal MD, FACP


[facility] ([facility])


O: [phone]


F: [phone]





Subjective


Date of service: 05/27/21


Principal diagnosis: ESRD


Interval history: 


Patient afebrile. Denies any complaints.  





Objective





- Exam


Narrative Exam: 


Physical Exam: 


Constitutional: Alert, cooperative. No acute distress


Head, Ears, Nose: Normocephalic, atraumatic. External ears, nose normal


Eyes: Conjunctivae/corneas clear. No icterus. No ptosis.


Neck: Supple, no meningeal signs


Cardiovascular: S1, S2 normal. 


Respiratory: Good air entry, clear to auscultation bilaterally


GI: Soft, non-tender; bowel sounds normal. No peritoneal signs


Musculoskeletal: No pedal edema, no cyanosis.


Skin: No rash or abscess.  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

no draining wounds.


Hem/Lymphatic: No palpable cervical or supraclavicular nodes. No lymphangitis


Psych: Mood ok. Affect normal


Neurological: Awake, alert, oriented. 





- Constitutional


Vitals: 


                                   Vital Signs











Temp Pulse Resp BP Pulse Ox


 


 97.7 F   60   16   96/61   96 


 


 05/27/21 04:14  05/27/21 04:14  05/27/21 04:14  05/27/21 04:14  05/27/21 09:15








                           Temperature -Last 24 Hours











Temperature                    97.7 F


 


Temperature                    98.3 F


 


Temperature                    98.1 F


 


Temperature                    32.1 F


 


Temperature                    98.2 F

















- Labs


CBC & Chem 7: 


                                 05/24/21 08:38





                                 05/25/21 06:05


Labs: 


                              Abnormal lab results











  05/26/21 05/26/21 05/26/21 Range/Units





  12:41 16:26 21:36 


 


POC Glucose  194 H  238 H  301 H  ()  mg/dL














  05/27/21 Range/Units





  07:31 


 


POC Glucose  145 H  ()  mg/dL

## 2021-05-28 LAB
BUN SERPL-MCNC: 44 MG/DL (ref 7–17)
BUN/CREAT SERPL: 44 %
HEMOLYSIS INDEX: 0

## 2021-05-28 PROCEDURE — 5A1D70Z PERFORMANCE OF URINARY FILTRATION, INTERMITTENT, LESS THAN 6 HOURS PER DAY: ICD-10-PCS | Performed by: INTERNAL MEDICINE

## 2021-05-28 RX ADMIN — SEVELAMER CARBONATE SCH MG: 800 TABLET, FILM COATED ORAL at 14:35

## 2021-05-28 RX ADMIN — INSULIN LISPRO SCH UNIT: 100 INJECTION, SOLUTION INTRAVENOUS; SUBCUTANEOUS at 09:17

## 2021-05-28 RX ADMIN — METOPROLOL TARTRATE SCH: 25 TABLET, FILM COATED ORAL at 17:46

## 2021-05-28 RX ADMIN — INSULIN LISPRO SCH UNIT: 100 INJECTION, SOLUTION INTRAVENOUS; SUBCUTANEOUS at 17:50

## 2021-05-28 RX ADMIN — Medication SCH UNIT: at 14:36

## 2021-05-28 RX ADMIN — GABAPENTIN SCH MG: 300 CAPSULE ORAL at 17:51

## 2021-05-28 RX ADMIN — INSULIN LISPRO SCH UNIT: 100 INJECTION, SOLUTION INTRAVENOUS; SUBCUTANEOUS at 23:08

## 2021-05-28 RX ADMIN — GABAPENTIN SCH MG: 300 CAPSULE ORAL at 21:00

## 2021-05-28 RX ADMIN — SEVELAMER CARBONATE SCH: 800 TABLET, FILM COATED ORAL at 14:34

## 2021-05-28 RX ADMIN — Medication SCH ML: at 14:37

## 2021-05-28 RX ADMIN — INSULIN HUMAN SCH UNIT: 100 INJECTION, SUSPENSION SUBCUTANEOUS at 09:16

## 2021-05-28 RX ADMIN — APIXABAN SCH MG: 5 TABLET, FILM COATED ORAL at 21:00

## 2021-05-28 RX ADMIN — INSULIN HUMAN SCH UNIT: 100 INJECTION, SUSPENSION SUBCUTANEOUS at 17:51

## 2021-05-28 RX ADMIN — PREGABALIN SCH MG: 75 CAPSULE ORAL at 21:00

## 2021-05-28 RX ADMIN — OXYCODONE AND ACETAMINOPHEN PRN TAB: 5; 325 TABLET ORAL at 01:38

## 2021-05-28 RX ADMIN — OXYCODONE AND ACETAMINOPHEN PRN TAB: 5; 325 TABLET ORAL at 14:36

## 2021-05-28 RX ADMIN — PREGABALIN SCH MG: 75 CAPSULE ORAL at 14:36

## 2021-05-28 RX ADMIN — INSULIN LISPRO SCH: 100 INJECTION, SOLUTION INTRAVENOUS; SUBCUTANEOUS at 14:37

## 2021-05-28 RX ADMIN — GABAPENTIN SCH MG: 300 CAPSULE ORAL at 06:15

## 2021-05-28 RX ADMIN — APIXABAN SCH MG: 5 TABLET, FILM COATED ORAL at 14:36

## 2021-05-28 RX ADMIN — CINACALCET HYDROCHLORIDE SCH MG: 30 TABLET, FILM COATED ORAL at 14:36

## 2021-05-28 RX ADMIN — Medication SCH ML: at 21:02

## 2021-05-28 RX ADMIN — SEVELAMER CARBONATE SCH MG: 800 TABLET, FILM COATED ORAL at 17:57

## 2021-05-28 RX ADMIN — PANTOPRAZOLE SODIUM SCH MG: 40 TABLET, DELAYED RELEASE ORAL at 14:36

## 2021-05-28 NOTE — PROGRESS NOTE
Assessment and Plan


Assessment and plan: 


65-year-old female patient was admitted with fever, lethargy, altered level of 

consciousness initial work-up is consistent with pneumonia on chest x-ray, end-

stage renal disease with hyperkalemia, evaluated by nephrology received 

hemodialysis last night.  This morning patient is more alert and awake resp

onding appropriately, ID nephrology consulted.  corona PCR test negative, liver 

mass/lesion on CT abdomen, MRI revealed hepatic cavernous hemangioma, GI 

consulted today.  Patient is more alert and awake responding appropriately


Still waiting for culture sensitivities report to adjust antibiotics per ID








--IDDM (insulin dependent diabetes mellitus) A1c 6.8


Current Visit: Yes   Status: Chronic   


Uncontrolled , Accu-Chek sliding scale coverage ADA diet 


Add long-acting insulin 70/30 Novolin 5 units twice a day





-- Acute toxic metabolic encephalopathy


Current Visit: Yes   Status: Acute   


Secondary to uremia and possible bacteremia


And pneumonia, improved


Patient is more alert and awake responding appropriately





-- Bilateral pneumonia POA


Current Visit: Yes   Status: Acute   


Possible  community-acquired pneumonia


Continue empiric antibiotics.  Continue Vanco


follow cultures, Procalcitonin is high





--Gram-positive cocci bacteremia;


Current Visit: Yes   Status: Acute   


Continue current antibiotics, vancomycin


Culture sensitivities not reported still pending





--Sepsis secondary to bilateral pneumonia; POA


Current Visit: Yes   Status: Acute 


Continue Vanco cefepime, follow cultures. 


ID following





--PUI /COVID-19 test negative


Current Visit: Yes   Status: Acute   


DC isolation





-- Liver mass, right lobe


Current Visit: Yes   Status: Acute   


Hepatic mass on CAT scan of the abdomen and CAT scan of the lung


MRI of the abdomen , hepatic cavernous hemangioma


GI evaluated, no intervention needed, stable





-- ESRD (end stage renal disease) on dialysis


Current Visit: Yes   Status: Chronic   


Received emergent hemodialysis on admission


HD per schedule, nephrology following





--Hyperkalemia resolved


Current Visit: Yes   Status: Acute   


Closely monitor electrolytes


Hemodialysis per schedule








--Morbid obesity; BMI 40.0


Current Visit: Yes   Status: Chronic


Patient needs diet modification exercise as tolerated and weight reduction


When medically stable


Also outpatient pulmonary evaluation to rule out KATERIN   





--Full code





--DVT prophylaxis


Current Visit: Yes   Status: Acute   


On heparin renal dose





We will closely monitor the patient and adjust management as needed


Consultant recommendations noted and appreciated


Plan of care reviewed with the patient and her nurse





5/24/2021; follow corona PCR test report


Follow consultants and recommendations





5/25/2021; MRI abdomen revealed, hepatic cavernous angioma


Consult GI, COVID-19 test negative, DC isolation





5/26/2021; continue current antibiotics.  Follow cultures


Consultant recommendations noted and appreciated





5/27/2021; patient continues to be on Vanco 


Blood sugars uncontrolled, add Novolin 70/30 5 units twice a day


Adjust as needed, advised diabetic education, nutrition education





5/28/2021; patient feels better, awaiting culture sensitivities of gram-positive

cocci


Continue Vanco, ID following











History


Interval history: 


I have seen and examined the patient at the bedside this morning


Patient's chart and medications reviewed


Patient feels better anxious to go home


Still waiting for sensitivities of blood cultures


Scheduled for dialysis today


Vital signs noted








Hospitalist Physical





- Constitutional


Vitals: 


                                        











Temp Pulse Resp BP Pulse Ox


 


 97.7 F   70   16   138/75   100 


 


 05/28/21 05:21  05/28/21 05:21  05/28/21 05:21  05/28/21 05:21  05/28/21 05:21











General appearance: Present: no acute distress, well-nourished, obese (Morbidly)





- EENT


Eyes: Present: PERRL, EOM intact





- Neck


Neck: Present: supple, normal ROM





- Respiratory


Respiratory effort: normal


Respiratory: bilateral: diminished, rhonchi, negative: rales, wheezing





- Cardiovascular


Rhythm: regular


Heart Sounds: Present: S1 & S2





- Extremities


Extremities: no ischemia


Extremity abnormal: edema





- Abdominal


General gastrointestinal: soft, non-tender, non-distended, normal bowel sounds





- Integumentary


Integumentary: Present: clear, warm





- Psychiatric


Psychiatric: appropriate mood/affect, cooperative





- Neurologic


Neurologic: CNII-XII intact, moves all extremities





HEART Score





- HEART Score


Age: 45-65


Risk factors: > 3 risk factors or hx of atherosclerotic disease


Troponin: 


                                        











Troponin T  0.088 ng/mL (0.00-0.029)  H  05/23/21  17:12    














- Critical Actions


Critical Actions: 4-6 pts:12-16.6% risk of adverse cardiac event. Should be 

admitted





Results





- Labs


CBC & Chem 7: 


                                 05/24/21 08:38





                                 05/28/21 06:20


Labs: 


                             Laboratory Last Values











WBC  7.7 K/mm3 (4.5-11.0)   05/24/21  08:38    


 


RBC  4.22 M/mm3 (3.65-5.03)   05/24/21  08:38    


 


Hgb  12.8 gm/dl (10.1-14.3)   05/24/21  08:38    


 


Hct  39.3 % (30.3-42.9)   05/24/21  08:38    


 


MCV  93 fl (79-97)   05/24/21  08:38    


 


MCH  30 pg (28-32)   05/24/21  08:38    


 


MCHC  32 % (30-34)   05/24/21  08:38    


 


RDW  18.7 % (13.2-15.2)  H  05/24/21  08:38    


 


Plt Count  109 K/mm3 (140-440)  L  05/24/21  08:38    


 


Lymph % (Auto)  Np   05/24/21  08:38    


 


Mono % (Auto)  Np   05/24/21  08:38    


 


Eos % (Auto)  Np   05/24/21  08:38    


 


Baso % (Auto)  Np   05/24/21  08:38    


 


Lymph # (Auto)  Np   05/24/21  08:38    


 


Mono # (Auto)  Np   05/24/21  08:38    


 


Eos # (Auto)  Np   05/24/21  08:38    


 


Baso # (Auto)  Np   05/24/21  08:38    


 


Seg Neutrophils %  Np   05/24/21  08:38    


 


Seg Neutrophils #  Np   05/24/21  08:38    


 


PT  16.2 Sec. (12.2-14.9)  H  05/23/21  17:12    


 


INR  1.32  (0.87-1.13)  H  05/23/21  17:12    


 


APTT  33.7 Sec. (24.2-36.6)   05/23/21  17:12    


 


D-Dimer  408.28 ng/mlDDU (0-234)  H  05/23/21  17:12    


 


Sodium  135 mmol/L (137-145)  L  05/28/21  06:20    


 


Potassium  4.7 mmol/L (3.6-5.0)   05/28/21  06:20    


 


Chloride  60.0 mmol/L ()  L  05/28/21  06:20    


 


Carbon Dioxide  26 mmol/L (22-30)  D 05/28/21  06:20    


 


Anion Gap  54 mmol/L  05/28/21  06:20    


 


BUN  44 mg/dL (7-17)  H  05/28/21  06:20    


 


Creatinine  7.0 mg/dL (0.6-1.2)  H  05/28/21  06:20    


 


Estimated GFR  Np   05/28/21  06:20    


 


BUN/Creatinine Ratio  44 %  05/28/21  06:20    


 


Glucose  150 mg/dL ()  H  05/28/21  06:20    


 


POC Glucose  167 mg/dL ()  H  05/28/21  07:44    


 


Hemoglobin A1c  6.8 % (4-6)  H  05/24/21  08:38    


 


Lactic Acid  1.70 mmol/L (0.7-2.0)   05/23/21  17:12    


 


Calcium  Np   05/28/21  06:20    


 


Phosphorus  3.30 mg/dL (2.5-4.5)   05/25/21  06:05    


 


Magnesium  2.00 mg/dL (1.7-2.3)   05/23/21  17:12    


 


Ferritin  203.6 ng/mL (10.0-200.0)  H  05/23/21  17:12    


 


Total Bilirubin  0.70 mg/dL (0.1-1.2)   05/24/21  08:38    


 


AST  11 units/L (5-40)   05/24/21  08:38    


 


ALT  7 units/L (7-56)   05/24/21  08:38    


 


Alkaline Phosphatase  143 units/L ()  H  05/24/21  08:38    


 


Ammonia  20.0 umol/L (25-60)  L  05/23/21  17:12    


 


Lactate Dehydrogenase  219 units/L ()  H  05/23/21  17:12    


 


Total Creatine Kinase  57 units/L ()   05/23/21  17:12    


 


Troponin T  0.088 ng/mL (0.00-0.029)  H  05/23/21  17:12    


 


C-Reactive Protein  5.00 mg/dL (0.00-1.30)  H  05/23/21  17:12    


 


Total Protein  7.1 g/dL (6.3-8.2)   05/24/21  08:38    


 


Albumin  3.5 g/dL (3.9-5)  L  05/24/21  08:38    


 


Albumin/Globulin Ratio  1.0 %  05/24/21  08:38    


 


Triglycerides  104 mg/dL (2-149)   05/23/21  17:12    


 


Cholesterol  158 mg/dL ()   05/23/21  17:12    


 


LDL Cholesterol Direct  90 mg/dL ()   05/23/21  17:12    


 


HDL Cholesterol  60 mg/dL (40-59)  H  05/23/21  17:12    


 


Cholesterol/HDL Ratio  2.63 %  05/23/21  17:12    


 


Procalcitonin  8.81 ng/mL (<0.15)   05/23/21  17:12    


 


TSH  0.696 mlU/mL (0.270-4.200)   05/23/21  17:12    


 


Random Vancomycin  24.2 ug/mL (0-40.0)   05/26/21  05:20    


 


Salicylates  < 0.3 mg/dL (2.8-20.0)  L  05/23/21  17:12    


 


Acetaminophen  5.0 ug/mL (10.0-30.0)  L  05/23/21  17:12    


 


Plasma/Serum Alcohol  < 0.01 % (0-0.07)   05/23/21  17:12    


 


Coronavirus (PCR)  Negative  (Negative)   05/23/21  09:10    


 


Hepatitis A IgM Ab  Non-reactive  (NonReactive)   05/23/21  20:52    


 


Hep Bs Antigen  Non-reactive  (Negative)   05/23/21  20:52    


 


Hep B Core IgM Ab  Non-reactive  (NonReactive)   05/23/21  20:52    


 


Hepatitis C Antibody  Non-reactive  (NonReactive)   05/23/21  20:52    


 


Blood Type  O POSITIVE   05/23/21  17:12    


 


Antibody Screen  Negative   05/23/21  17:12    











Microbiology: 


Microbiology





05/27/21 01:25   Peripheral/Venous   Blood Culture - Preliminary


                            Culture in Progress


05/27/21 22:57   Peripheral/Venous   Blood Culture - Preliminary


                            Culture in Progress


05/23/21 16:58   Peripheral/Venous   Blood Culture - Preliminary


                            NO GROWTH AFTER 4 DAYS








Cunha/IV: 


                                        





Voiding Method                   Toilet











Active Medications





- Current Medications


Current Medications: 














Generic Name Dose Route Start Last Admin





  Trade Name Freq  PRN Reason Stop Dose Admin


 


Acetaminophen  650 mg  05/23/21 22:33 





  Acetaminophen 325 Mg Tab  PO  





  Q4H PRN  





  Pain MILD(1-3)/Fever >100.5/HA  


 


Apixaban  5 mg  05/24/21 22:30  05/27/21 22:31





  Apixaban 5 Mg Tab  PO   5 mg





  BID JUDY   Administration


 


Cholecalciferol  2,000 unit  05/24/21 10:00  05/27/21 09:34





  Cholecalciferol (Vit D3) 1000 Unit (25 Mcg) Tab  PO   2,000 unit





  QDAY JUDY   Administration


 


Cinacalcet  30 mg  05/24/21 10:00  05/27/21 09:34





  Cinacalcet 30 Mg Tab  PO   30 mg





  DAILY JUDY   Administration


 


Dextrose  50 gm  05/23/21 18:00  05/23/21 19:30





  Dextrose 50% In Water (25gm) 50 Ml Vial  IV   50 gm





  Q30MIN PRN   Administration





  Hypoglycemia  





  Protocol  


 


Diltiazem HCl  180 mg  05/24/21 10:00  05/27/21 10:00





  Diltiazem Cd 180 Mg Cap  PO   Not Given





  [facility]ILY [facility]  


 


Gabapentin  600 mg  05/23/21 22:30  05/28/21 06:15





  Gabapentin 300 Mg Cap  PO   600 mg





  Q8HR JUDY   Administration


 


Sodium Chloride  100 mls @ 999 mls/hr  05/24/21 10:03 





  Nacl 0.9%  IV  





  SELINA PRN  





  Hypotension  


 


Vancomycin HCl  1 gm in 250 mls @ 167.007 mls/hr  05/28/21 18:00 





  Vancomycin/Ns 1 Gm/250 Ml  IV  





  MoWeFr@1800 JUDY  


 


Insulin Human Isoph/Insulin Regular  5 unit  05/27/21 08:00  05/28/21 09:16





  Insulin Nph/Regular 70/30 Inj  SUB-Q   5 unit





  BIDDIAB JUDY   Administration


 


Insulin Human Lispro  0 unit  05/24/21 07:30  05/28/21 09:17





  Insulin Lispro 100 Unit/Ml  SUB-Q   2 unit





  [facility]   Administration





  Protocol  


 


Metoclopramide HCl  5 mg  05/23/21 22:48 





  Metoclopramide 10 Mg/2 Ml Inj  IV  





  Q6H PRN  





  Nausea And Vomiting  


 


Metoprolol Tartrate  25 mg  05/24/21 10:00  05/27/21 10:00





  Metoprolol Tartrate 25 Mg Tab  PO   Not Given





  DAILY [facility]  


 


Morphine Sulfate  2 mg  05/23/21 22:33 





  Morphine 2 Mg/1 Ml Inj  IV  





  Q4H PRN  





  Pain, Moderate (4-6)  


 


Ondansetron HCl  4 mg  05/23/21 22:33 





  Ondansetron 4 Mg/2 Ml Inj  IV  





  Q8H PRN  





  Nausea And Vomiting  


 


Oxycodone/Acetaminophen  1 tab  05/23/21 22:33  05/28/21 01:38





  Oxycodone /Acetaminophen 5-325mg Tab  PO   1 tab





  Q6H PRN   Administration





  Pain, Moderate (4-6)  


 


Pantoprazole Sodium  40 mg  05/24/21 10:00  05/27/21 09:34





  Pantoprazole 40 Mg Tab  PO   40 mg





  DAILY JUDY   Administration


 


Pregabalin  75 mg  05/24/21 10:00  05/27/21 22:32





  Pregabalin 75 Mg Cap  PO   75 mg





  BID JUDY   Administration


 


Sevelamer Carbonate  3,200 mg  05/24/21 08:00  05/27/21 17:02





  Sevelamer Carbonate 800 Mg Tab  PO   3,200 mg





  TIDWM JUDY   Administration


 


Sodium Chloride  10 ml  05/24/21 10:00  05/27/21 22:32





  Sodium Chloride 0.9% 10 Ml Flush Syringe  IV   10 ml





  BID JUDY   Administration


 


Sodium Chloride  10 ml  05/23/21 22:33 





  Sodium Chloride 0.9% 10 Ml Flush Syringe  IV  





  PRN PRN  





  LINE FLUSH  














Nutrition/Malnutrition Assess





- Dietary Evaluation


Nutrition/Malnutrition Findings: 


                                        





Nutrition Notes                                            Start:  05/26/21 

09:48


Freq:                                                      Status: Active       




Protocol:                                                                       




 Document     05/28/21 09:58  CW  (Rec: 05/28/21 10:00  CW  [id number])


 Nutrition Notes


     Need for Assessment generated from:         LOS


     Initial or Follow up                        Brief Note


     Current Diet                                Renal Diet


     Labs/Tests                                  HbgA1c 6.8


                                                 


     Subjective/Other Information                Screen for LOS. Pt continues


                                                 to be eating well at this time


                                                 . Delio score remains


                                                 unchanged. Skin remains intact


                                                 .


     Current % PO                                Good (%)


 Nutrition Intervention


     Change Diet Order:                          Change diet to Renal


                                                 Consistent Carbohydrate Diet


     Anticipated Discharge Needs:                Renal diet


     Revisit per MD consult or patient           Sign Off


      request:

## 2021-05-28 NOTE — PROGRESS NOTE
Assessment and Plan


Cultures:


SARS CoV2 PCR: negative


5/23/2021 blood culture: 1 out of 4 bottles with Strep Group T


5/27/2021 blood culture: no growth





A/P:


65-year-old female with hypertension, ESRD, diabetes was admitted to the 

hospital with altered mental status, fever:





#Sepsis, secondary to bilateral pneumonia. Clinically feeling back to normal. 





#Strep Group G bacteremia: source unclear. ?pneumonia. Does have indwelling PORT

but bacteremia not high grade, only 1/4 bottles positive. 





#Acute hypoxic respiratory failure: resolved.





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 

Stable.





#Indwelling Port-A-Cath





#ESRD on HD: Renally dose antibiotics.





Recs:


-switched to Ceftriaxone 2 gm daily


-if repeat blood cultures are negative at 48 hours and TTE unremarkable, OK for 

discharge tomorrow. CM orders placed for IV Ancef post HD ending 6/6/2021





Carlotta Mariscal MD, FACP


[facility] ([facility])


O: [phone]


F: [phone]





Subjective


Date of service: 05/28/21


Principal diagnosis: ESRD


Interval history: 


Patient afebrile. Denies any complaints.  Hoping she can go home soon. 





Objective





- Exam


Narrative Exam: 


Physical Exam: 


Constitutional: Alert, cooperative. No acute distress


Head, Ears, Nose: Normocephalic, atraumatic. External ears, nose normal


Eyes: Conjunctivae/corneas clear. No icterus. No ptosis.


Neck: Supple, no meningeal signs


Cardiovascular: S1, S2 normal. 


Respiratory: Good air entry, clear to auscultation bilaterally


GI: Soft, non-tender; bowel sounds normal. No peritoneal signs


Musculoskeletal: No pedal edema, no cyanosis.


Skin: No rash or abscess.  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

no draining wounds.


Hem/Lymphatic: No palpable cervical or supraclavicular nodes. No lymphangitis


Psych: Mood ok. Affect normal


Neurological: Awake, alert, oriented.  





- Constitutional


Vitals: 


                                   Vital Signs











Temp Pulse Resp BP Pulse Ox


 


 97.7 F   59 L  18   99/55   100 


 


 05/28/21 10:35  05/28/21 13:35  05/28/21 10:35  05/28/21 13:35  05/28/21 10:35








                           Temperature -Last 24 Hours











Temperature                    97.7 F


 


Temperature                    97.7 F


 


Temperature                    97.8 F


 


Temperature                    98.1 F

















- Labs


CBC & Chem 7: 


                                 05/24/21 08:38





                                 05/28/21 06:20


Labs: 


                              Abnormal lab results











  05/27/21 05/27/21 05/28/21 Range/Units





  16:40 22:07 06:20 


 


Sodium    135 L  (137-145)  mmol/L


 


Chloride    60.0 L  ()  mmol/L


 


BUN    44 H  (7-17)  mg/dL


 


Creatinine    7.0 H  (0.6-1.2)  mg/dL


 


Glucose    150 H  ()  mg/dL


 


POC Glucose  253 H  153 H   ()  mg/dL














  05/28/21 Range/Units





  07:44 


 


Sodium   (137-145)  mmol/L


 


Chloride   ()  mmol/L


 


BUN   (7-17)  mg/dL


 


Creatinine   (0.6-1.2)  mg/dL


 


Glucose   ()  mg/dL


 


POC Glucose  167 H  ()  mg/dL

## 2021-05-28 NOTE — PROGRESS NOTE
Assessment and Plan








Assessment:


Altered Mental Status


Pneumonia


Hyperkalemia


Large Right Hepatic lobe lesion


End Stage Renal Disease


Hypertension, now Hypotensive


Diabetes Mellitus


PVD








Plan:


-Hemodialysis today for UF and clearance


-Fluid restriction of 1 liter per day


-Hold BP meds if Hypotensive


-IV Albumin prn as needed with HD


-Obtain daily weights


-Monitor I/O's daily


-Assess dialysis needs daily 





Subjective


Date of service: 05/28/21


Principal diagnosis: ESRD


Interval history: 





Patient off floor





Objective





- Vital Signs


Vital signs: 


                               Vital Signs - 12hr











  05/28/21





  05:21


 


Temperature 97.7 F


 


Pulse Rate 70


 


Respiratory 16





Rate 


 


Blood Pressure 138/75


 


O2 Sat by Pulse 100





Oximetry 














- Lab





                                 05/24/21 08:38





                                 05/28/21 06:20


                             Most recent lab results











Calcium  Np   05/28/21  06:20    


 


Phosphorus  3.30 mg/dL (2.5-4.5)   05/25/21  06:05    


 


Magnesium  2.00 mg/dL (1.7-2.3)   05/23/21  17:12    














Medications & Allergies





- Medications


Allergies/Adverse Reactions: 


                                    Allergies





sulfamethoxazole [From Bactrim] Allergy (Unverified 10/15/14 05:48)


   Shortness of Breath/HIVES


trimethoprim [From Bactrim] Allergy (Unverified 10/15/14 05:48)


   Shortness of Breath/HIVES








Home Medications: 


                                Home Medications











 Medication  Instructions  Recorded  Confirmed  Last Taken  Type


 


Apixaban [Eliquis] 5 mg PO BID 05/23/21 05/27/21 05/21/21 22:00 History


 


Cholecalciferol (Vitamin D3) 2,000 unit PO QDAY 05/23/21 05/27/21 05/21/21 10:00

 History





[Vitamin D3 2,000 UNIT CAP]     


 


Cinacalcet HCl [Sensipar] 30 mg PO DAILY 05/23/21 05/27/21 05/21/21 10:00 

History


 


Gabapentin [Neurontin] 600 mg PO Q8H 05/23/21 05/27/21 05/21/21 21:00 History


 


Insulin Aspart (Nf) [NovoLOG 1 unit SQ DAILY 05/23/21 05/27/21 05/21/21 08:00 

History





Flexpen]     


 


Metoprolol [Lopressor] 25 mg PO DAILY 05/23/21 05/27/21 05/21/21 10:00 History


 


Pantoprazole Sodium [Protonix] 40 mg PO DAILY 05/23/21 05/27/21 05/21/21 10:00 

History


 


Pregabalin [Lyrica] 75 mg PO BID 05/23/21 05/27/21 05/21/21 21:00 History


 


Sevelamer Carbonate [Renvela] 800 mg PO TIDWM PRN 05/23/21 05/27/21 05/21/21 

17:00 History


 


Tadalafil [Cialis] 20 mg PO BID 05/23/21 05/27/21 05/21/21 10:00 History


 


Tramadol HCl [traMADol  MG] 1 tab PO DAILY 05/23/21 05/27/21 05/21/21 

10:00 History


 


dilTIAZem HCl [Diltiazem 24Hr  mg PO DAILY 05/23/21 05/27/21 05/21/21 

08:00 History





(Cd)]     











Active Medications: 














Generic Name Dose Route Start Last Admin





  Trade Name Freq  PRN Reason Stop Dose Admin


 


Acetaminophen  650 mg  05/23/21 22:33 





  Acetaminophen 325 Mg Tab  PO  





  Q4H PRN  





  Pain MILD(1-3)/Fever >100.5/HA  


 


Apixaban  5 mg  05/24/21 22:30  05/27/21 22:31





  Apixaban 5 Mg Tab  PO   5 mg





  BID JUDY   Administration


 


Cholecalciferol  2,000 unit  05/24/21 10:00  05/27/21 09:34





  Cholecalciferol (Vit D3) 1000 Unit (25 Mcg) Tab  PO   2,000 unit





  QDAY JUDY   Administration


 


Cinacalcet  30 mg  05/24/21 10:00  05/27/21 09:34





  Cinacalcet 30 Mg Tab  PO   30 mg





  DAILY JUDY   Administration


 


Dextrose  50 gm  05/23/21 18:00  05/23/21 19:30





  Dextrose 50% In Water (25gm) 50 Ml Vial  IV   50 gm





  Q30MIN PRN   Administration





  Hypoglycemia  





  Protocol  


 


Diltiazem HCl  180 mg  05/24/21 10:00  05/27/21 10:00





  Diltiazem Cd 180 Mg Cap  PO   Not Given





  DAILY JUDY  


 


Gabapentin  600 mg  05/23/21 22:30  05/28/21 06:15





  Gabapentin 300 Mg Cap  PO   600 mg





  Q8HR JUDY   Administration


 


Sodium Chloride  100 mls @ 999 mls/hr  05/24/21 10:03 





  Nacl 0.9%  IV  





  SELINA PRN  





  Hypotension  


 


Vancomycin HCl  1 gm in 250 mls @ 167.007 mls/hr  05/28/21 18:00 





  Vancomycin/Ns 1 Gm/250 Ml  IV  





  MoWeFr@1800 JUDY  


 


Insulin Human Isoph/Insulin Regular  5 unit  05/27/21 08:00  05/28/21 09:16





  Insulin Nph/Regular 70/30 Inj  SUB-Q   5 unit





  BIDDIAB JUDY   Administration


 


Insulin Human Lispro  0 unit  05/24/21 07:30  05/28/21 09:17





  Insulin Lispro 100 Unit/Ml  SUB-Q   2 unit





  [facility] JUDY   Administration





  Protocol  


 


Metoclopramide HCl  5 mg  05/23/21 22:48 





  Metoclopramide 10 Mg/2 Ml Inj  IV  





  Q6H PRN  





  Nausea And Vomiting  


 


Metoprolol Tartrate  25 mg  05/24/21 10:00  05/27/21 10:00





  Metoprolol Tartrate 25 Mg Tab  PO   Not Given





  DAILY [facility]  


 


Morphine Sulfate  2 mg  05/23/21 22:33 





  Morphine 2 Mg/1 Ml Inj  IV  





  Q4H PRN  





  Pain, Moderate (4-6)  


 


Ondansetron HCl  4 mg  05/23/21 22:33 





  Ondansetron 4 Mg/2 Ml Inj  IV  





  Q8H PRN  





  Nausea And Vomiting  


 


Oxycodone/Acetaminophen  1 tab  05/23/21 22:33  05/28/21 01:38





  Oxycodone /Acetaminophen 5-325mg Tab  PO   1 tab





  Q6H PRN   Administration





  Pain, Moderate (4-6)  


 


Pantoprazole Sodium  40 mg  05/24/21 10:00  05/27/21 09:34





  Pantoprazole 40 Mg Tab  PO   40 mg





  DAILY JUDY   Administration


 


Pregabalin  75 mg  05/24/21 10:00  05/27/21 22:32





  Pregabalin 75 Mg Cap  PO   75 mg





  BID JUDY   Administration


 


Sevelamer Carbonate  3,200 mg  05/24/21 08:00  05/27/21 17:02





  Sevelamer Carbonate 800 Mg Tab  PO   3,200 mg





  TIDWM JUDY   Administration


 


Sodium Chloride  10 ml  05/24/21 10:00  05/27/21 22:32





  Sodium Chloride 0.9% 10 Ml Flush Syringe  IV   10 ml





  BID JUDY   Administration


 


Sodium Chloride  10 ml  05/23/21 22:33 





  Sodium Chloride 0.9% 10 Ml Flush Syringe  IV  





  PRN PRN  





  LINE FLUSH

## 2021-05-29 VITALS — SYSTOLIC BLOOD PRESSURE: 122 MMHG | DIASTOLIC BLOOD PRESSURE: 69 MMHG

## 2021-05-29 RX ADMIN — OXYCODONE AND ACETAMINOPHEN PRN TAB: 5; 325 TABLET ORAL at 12:03

## 2021-05-29 RX ADMIN — METOPROLOL TARTRATE SCH: 25 TABLET, FILM COATED ORAL at 09:39

## 2021-05-29 RX ADMIN — Medication SCH UNIT: at 09:40

## 2021-05-29 RX ADMIN — APIXABAN SCH MG: 5 TABLET, FILM COATED ORAL at 09:40

## 2021-05-29 RX ADMIN — SEVELAMER CARBONATE SCH MG: 800 TABLET, FILM COATED ORAL at 08:42

## 2021-05-29 RX ADMIN — INSULIN LISPRO SCH UNIT: 100 INJECTION, SOLUTION INTRAVENOUS; SUBCUTANEOUS at 08:44

## 2021-05-29 RX ADMIN — SEVELAMER CARBONATE SCH MG: 800 TABLET, FILM COATED ORAL at 12:03

## 2021-05-29 RX ADMIN — PREGABALIN SCH MG: 75 CAPSULE ORAL at 09:41

## 2021-05-29 RX ADMIN — GABAPENTIN SCH MG: 300 CAPSULE ORAL at 05:40

## 2021-05-29 RX ADMIN — Medication SCH ML: at 09:41

## 2021-05-29 RX ADMIN — INSULIN LISPRO SCH UNIT: 100 INJECTION, SOLUTION INTRAVENOUS; SUBCUTANEOUS at 12:49

## 2021-05-29 RX ADMIN — CINACALCET HYDROCHLORIDE SCH MG: 30 TABLET, FILM COATED ORAL at 09:40

## 2021-05-29 RX ADMIN — PANTOPRAZOLE SODIUM SCH MG: 40 TABLET, DELAYED RELEASE ORAL at 09:40

## 2021-05-29 RX ADMIN — INSULIN HUMAN SCH UNIT: 100 INJECTION, SUSPENSION SUBCUTANEOUS at 08:43

## 2021-05-29 NOTE — DISCHARGE SUMMARY
Providers





- Providers


Date of Admission: 


05/23/21 21:43





Date of discharge: 05/29/21


Attending physician: 


AMY J KOCHERLA





                                        





05/23/21 16:24


Consult to Physician [CONS] Urgent 


   Comment: 


   Consulting Provider: SUNIL SANDERS


   Physician Instructions: 


   Reason For Exam: esrd





05/23/21 17:49


Consult to Physician [CONS] Urgent 


   Comment: 


   Consulting Provider: CHASIDY GUIDRY


   Physician Instructions: 


   Reason For Exam: covid





05/24/21 01:39


Consult to Wound/ET Nurse [CONS] Routine 


   Reason For Exam: wound eval





05/25/21 08:19


Consult to Physician [CONS] Routine 


   Comment: 


   Consulting Provider: KEVIN LITTLE


   Physician Instructions: 


   Reason For Exam: Right hepatic mass/hemangioma





05/25/21 08:55


Physical Therapy Evaluation and Treat [CONS] Stat 


   Comment: 


   Reason For Exam: Eval and treat





05/28/21 15:58


Consult to Case Management [CONS] Routine 


   Services Needed at Discharge: Other


   Notified:: cm


   Comment:: IV Ancef at dialysis


   Additional Physician Instructions: [facility] 

([facility])


                                        O: [phone]


                                        F: [phone]


                                        OUTPATIENT PARENTERAL ANTIBIOTIC THERAPY

 (OPAT) ORDERS


                                        Diagnoses: Streptococcus bacteremia


                                        Antimicrobial administration: IV 

Cefazolin 2gm post HD ending 6/6/2021


                                        Lab monitoring:


                                        - CBC with differential, Creatinine, 

ALT, AST once a week every


                                        Monday/Tuesday while on IV antibiotics.


                                        Please fax results to [phone] and 

call [phone] for critical lab


                                        results.


                                        [facility]











Primary care physician: 


PRIMARY CARE MD








Hospitalization


Reason for admission: Altered level of consciousness/acute toxic metabolic 

encephalopathy


Condition: Stable


Pertinent studies: 


CT head


CT chest


CT abdomen and pelvis


MRI abdomen and pelvis


Echocardiogram; no endocarditis


Hospital course: 


65-year-old female patient was admitted with fever, lethargy, altered level of 

consciousness initial work-up is consistent with pneumonia on chest x-ray, end-

stage renal disease with hyperkalemia, evaluated by nephrology received 

hemodialysis per schedule and as needed.  Patient was evaluated by ID received 

Rocephin during hospital stay, after culture sensitivities ID has recommended 

Ancef IV post hemodialysis with end date of 6/6/2021.  IV antibiotic information

 was sent to the dialysis center by case management, case management assisted 

with DC planning


CT scan of the abdomen show questionable liver mass, underwent MRI, showed 

hemangioma, 


evaluated by GI,  stable hemangioma no intervention needed 


Patient has negative COVID-19 test on 5/23/2021.


Today patient is comfortable no new complaints vital signs stable


Physical examination prior to discharge no new changes


Cleared by all the consultants


Stable at discharge.








Discharge diagnosis;


 -- Acute toxic metabolic encephalopathy


Secondary to uremia and bacteremia, pneumonia, 


patient received treatment for the underlying cause


Now patient is at his baseline





-- Bilateral pneumonia POA


Possible community-acquired pneumonia


Received Rocephin during hospital stay


Discharged on IV Ancef postdialysis dialysis till 6/6/2021





--Gram-positive cocci bacteremia;


Continue current antibiotics, vancomycin


Culture sensitivities not reported still pending


Echo; no endocarditis





--IDDM (insulin dependent diabetes mellitus) A1c 6.8


mildly elevated blood sugars, advised to follow diabetic diet


Check with primary care physician for further evaluation management





 --Sepsis secondary to bilateral pneumonia; POA


Continue current antibiotics, Ancef after discharge till 6/6/2021





--PUI /COVID-19 test negative 5/23/2021





-- Liver mass, right lobe/hemangioma


 Hepatic mass on CT scan and MRI-hemangioma


GI evaluated no intervention





-- ESRD (end stage renal disease) on dialysis


Received emergent hemodialysis on admission


HD per schedule ,outpatient HD per schedule





--Hyperkalemia resolved


Resolved HD per schedule





--Morbid obesity; BMI 40.0


Patient needs diet modification exercise as tolerated and weight reduction


When medically stable,Also outpatient pulmonary evaluation to rule out KATERIN   





--DVT prophylaxis


Received  heparin renal dose during hospital stay


Advised increase ambulation post discharge as tolerated





Cleared by consultants stable at discharge








Disposition: DC-01 TO HOME OR SELFCARE


Final Discharge Diagnosis (Prints w/discharge instructions): Acute toxic 

metabolic encephalopathy.  Bilateral pneumonia.  Gram-positive bacteremia.  

Sepsis secondary to bilateral pneumonia.  Liver hemangioma.  COVID-19 test 

negative.  Hyperkalemia.  ESRD on HD.  Morbid obesity BMI 40.0.  Hyperkalemia


Time spent for discharge: 35 min





Core Measure Documentation





- Palliative Care


Palliative Care/ Comfort Measures: Not Applicable





- Core Measures


Any of the following diagnoses?: none





Exam





- Constitutional


Vitals: 


                                        











Temp Pulse Resp BP Pulse Ox


 


 98.3 F   83   16   105/53   99 


 


 05/29/21 04:02  05/29/21 09:39  05/29/21 04:02  05/29/21 09:39  05/29/21 10:00











General appearance: Present: no acute distress, well-nourished, obese





- EENT


Eyes: Present: PERRL, EOM intact





- Neck


Neck: Present: supple, normal ROM





- Respiratory


Respiratory effort: normal


Respiratory: bilateral: diminished, negative: rales, rhonchi, wheezing





- Cardiovascular


Rhythm: regular


Heart Sounds: Present: S1 & S2





- Extremities


Extremities: no ischemia, No edema





- Abdominal


General gastrointestinal: Present: soft, non-tender, non-distended, normal bowel

 sounds





- Integumentary


Integumentary: Present: clear, warm





- Musculoskeletal


Musculoskeletal: strength equal bilaterally





- Psychiatric


Psychiatric: appropriate mood/affect, cooperative





- Neurologic


Neurologic: CNII-XII intact, moves all extremities





Plan


Activity: advance as tolerated, fall precautions


Diet: diabetic, renal


Additional Instructions: ID advised IV Ancef postdialysis[on dialysis days] end 

date l 6/6/2021.  The information is sent to the dialysis center by CM.  Follow 

renal and hemodialysis per schedule.  If you have worsening symptoms contact MD 

or go to emergency room.  Advised diet modification, exercise as tolerated and 

weight reduction when you are medically stable.  Your Covid test is negative on 

5/23/2021


Follow up with: 


PRIMARY CARE,MD [Primary Care Provider] - 3-5 Days


SUNIL SANDERS MD [Staff Physician] - 7 Days


QUINCY AYALA MD [Staff Physician] - 7 Days


Prescriptions: 


Simethicone [Gas-X] 62.5 mg PO TID PRN #2 strip


 PRN Reason: Gas Pain

## 2021-05-29 NOTE — PROGRESS NOTE
Assessment and Plan


Cultures:


SARS CoV2 PCR: negative


5/23/2021 blood culture: 1 out of 4 bottles with Strep Group G


5/27/2021 blood culture: no growth





A/P:


65-year-old female with hypertension, ESRD, diabetes was admitted to the 

hospital with altered mental status, fever:





#Sepsis, secondary to bilateral pneumonia. Clinically feeling back to normal. 





#Strep Group G bacteremia: source unclear. ?pneumonia. Does have indwelling PORT

but bacteremia not high grade, only 1/4 bottles positive. 





#Acute hypoxic respiratory failure: resolved.





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 

Stable.





#Indwelling Port-A-Cath





#ESRD on HD: Renally dose antibiotics.





Recs:


-continue Ceftriaxone 2 gm daily


-if TTE unremarkable, OK for discharge today. CM orders were already placed for 

IV Ancef post HD ending 6/6/2021





Carlotta Mariscal MD, FACP


[facility] ([facility])


O: [phone]


F: [phone]





Subjective


Date of service: 05/29/21


Principal diagnosis: ESRD


Interval history: 


Patient afebrile. Denies any complaints. 





Objective





- Exam


Narrative Exam: 


Physical Exam: 


Constitutional: Alert, cooperative. No acute distress


Head, Ears, Nose: Normocephalic, atraumatic. External ears, nose normal


Eyes: Conjunctivae/corneas clear. No icterus. No ptosis.


Neck: Supple, no meningeal signs


Cardiovascular: S1, S2 normal. PORT +


Respiratory: Good air entry, clear to auscultation bilaterally


GI: Soft, non-tender; bowel sounds normal. No peritoneal signs


Musculoskeletal: No pedal edema, no cyanosis.


Skin: No rash or abscess.  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

no draining wounds.


Hem/Lymphatic: No palpable cervical or supraclavicular nodes. No lymphangitis


Psych: Mood ok. Affect normal


Neurological: Awake, alert, oriented.   





- Constitutional


Vitals: 


                                   Vital Signs











Temp Pulse Resp BP Pulse Ox


 


 98.3 F   83   16   105/53   100 


 


 05/29/21 04:02  05/29/21 09:39  05/29/21 04:02  05/29/21 09:39  05/29/21 04:02








                           Temperature -Last 24 Hours











Temperature                    98.3 F


 


Temperature                    98.4 F


 


Temperature                    98.0 F

















- Labs


CBC & Chem 7: 


                                 05/24/21 08:38





                                 05/28/21 06:20


Labs: 


                              Abnormal lab results











  05/28/21 05/28/21 05/29/21 Range/Units





  17:43 21:33 07:28 


 


POC Glucose  210 H  184 H  183 H  ()  mg/dL

## 2021-05-29 NOTE — PROGRESS NOTE
Assessment and Plan








Assessment:


Altered Mental Status


Pneumonia


Hyperkalemia


Large Right Hepatic lobe lesion


End Stage Renal Disease


Hypertension, now Hypotensive


Diabetes Mellitus


PVD








Plan:


-S/P Hemodialysis yesterday for UF and clearance


-Fluid restriction of 1 liter per day


-Hold BP meds if Hypotensive


-IV Albumin prn as needed with HD


-Obtain daily weights


-Monitor I/O's daily


-Assess dialysis needs daily 





Subjective


Date of service: 05/29/21


Principal diagnosis: ESRD


Interval history: 





Patient seen sitting up at edge of bed wiping herself up. States feeling ok at 

the moment.





Objective





- Vital Signs


Vital signs: 


                               Vital Signs - 12hr











  05/28/21 05/29/21





  21:35 04:02


 


Temperature 98.4 F 98.3 F


 


Pulse Rate 79 75


 


Respiratory 16 16





Rate  


 


Blood Pressure 105/59 100/51


 


O2 Sat by Pulse 95 100





Oximetry  














- General Appearance


General appearance: well-developed, appears stated age


EENT: ATNC, PERRL, hearing intact, vision intact


Neck: no JVD, supple


Respiratory: Present: Decreased Breath Sounds


Cardiology: S1S2


Gastrointestinal: normoactive bowel sounds


Integumentary: warm and dry


Neurologic: alert and oriented x3


Musculoskeletal: other (positive edema, chronic pvd)





- Lab





                                 05/24/21 08:38





                                 05/28/21 06:20


                             Most recent lab results











Calcium  Np   05/28/21  06:20    


 


Phosphorus  3.30 mg/dL (2.5-4.5)   05/25/21  06:05    


 


Magnesium  2.00 mg/dL (1.7-2.3)   05/23/21  17:12    














Medications & Allergies





- Medications


Allergies/Adverse Reactions: 


                                    Allergies





sulfamethoxazole [From Bactrim] Allergy (Unverified 10/15/14 05:48)


   Shortness of Breath/HIVES


trimethoprim [From Bactrim] Allergy (Unverified 10/15/14 05:48)


   Shortness of Breath/HIVES








Home Medications: 


                                Home Medications











 Medication  Instructions  Recorded  Confirmed  Last Taken  Type


 


Apixaban [Eliquis] 5 mg PO BID 05/23/21 05/27/21 05/21/21 22:00 History


 


Cholecalciferol (Vitamin D3) 2,000 unit PO QDAY 05/23/21 05/27/21 05/21/21 10:00

 History





[Vitamin D3 2,000 UNIT CAP]     


 


Cinacalcet HCl [Sensipar] 30 mg PO DAILY 05/23/21 05/27/21 05/21/21 10:00 

History


 


Gabapentin [Neurontin] 600 mg PO Q8H 05/23/21 05/27/21 05/21/21 21:00 History


 


Insulin Aspart (Nf) [NovoLOG 1 unit SQ DAILY 05/23/21 05/27/21 05/21/21 08:00 

History





Flexpen]     


 


Metoprolol [Lopressor] 25 mg PO DAILY 05/23/21 05/27/21 05/21/21 10:00 History


 


Pantoprazole Sodium [Protonix] 40 mg PO DAILY 05/23/21 05/27/21 05/21/21 10:00 

History


 


Pregabalin [Lyrica] 75 mg PO BID 05/23/21 05/27/21 05/21/21 21:00 History


 


Sevelamer Carbonate [Renvela] 800 mg PO TIDWM PRN 05/23/21 05/27/21 05/21/21 

17:00 History


 


Tadalafil [Cialis] 20 mg PO BID 05/23/21 05/27/21 05/21/21 10:00 History


 


Tramadol HCl [traMADol  MG] 1 tab PO DAILY 05/23/21 05/27/21 05/21/21 

10:00 History


 


dilTIAZem HCl [Diltiazem 24Hr  mg PO DAILY 05/23/21 05/27/21 05/21/21 

08:00 History





(Cd)]     











Active Medications: 














Generic Name Dose Route Start Last Admin





  Trade Name Freq  PRN Reason Stop Dose Admin


 


Acetaminophen  650 mg  05/23/21 22:33 





  Acetaminophen 325 Mg Tab  PO  





  Q4H PRN  





  Pain MILD(1-3)/Fever >100.5/HA  


 


Apixaban  5 mg  05/24/21 22:30  05/28/21 21:00





  Apixaban 5 Mg Tab  PO   5 mg





  BID JUDY   Administration


 


Cholecalciferol  2,000 unit  05/24/21 10:00  05/28/21 14:36





  Cholecalciferol (Vit D3) 1000 Unit (25 Mcg) Tab  PO   2,000 unit





  QDAY JUDY   Administration


 


Cinacalcet  30 mg  05/24/21 10:00  05/28/21 14:36





  Cinacalcet 30 Mg Tab  PO   30 mg





  DAILY JUDY   Administration


 


Dextrose  50 gm  05/23/21 18:00  05/23/21 19:30





  Dextrose 50% In Water (25gm) 50 Ml Vial  IV   50 gm





  Q30MIN PRN   Administration





  Hypoglycemia  





  Protocol  


 


Diltiazem HCl  180 mg  05/24/21 10:00  05/28/21 17:46





  Diltiazem Cd 180 Mg Cap  PO   Not Given





  DAILY JUDY  


 


Gabapentin  600 mg  05/23/21 22:30  05/29/21 05:40





  Gabapentin 300 Mg Cap  PO   600 mg





  Q8HR JUDY   Administration


 


Sodium Chloride  100 mls @ 999 mls/hr  05/24/21 10:03 





  Nacl 0.9%  IV  





  SELINA PRN  





  Hypotension  


 


Ceftriaxone Sodium  2 gm in 100 mls @ 200 mls/hr  05/28/21 17:00  05/28/21 17:57





  Rocephin/Ns 2 Gm/100 Ml  IV   200 mls/hr





  Q24H JUDY   Administration





  Protocol  


 


Insulin Human Isoph/Insulin Regular  5 unit  05/27/21 08:00  05/29/21 08:43





  Insulin Nph/Regular 70/30 Inj  SUB-Q   5 unit





  BIDDIAB JUDY   Administration


 


Insulin Human Lispro  0 unit  05/24/21 07:30  05/29/21 08:44





  Insulin Lispro 100 Unit/Ml  SUB-Q   2 unit





  ACHS JUDY   Administration





  Protocol  


 


Metoclopramide HCl  5 mg  05/23/21 22:48 





  Metoclopramide 10 Mg/2 Ml Inj  IV  





  Q6H PRN  





  Nausea And Vomiting  


 


Metoprolol Tartrate  25 mg  05/24/21 10:00  05/28/21 17:46





  Metoprolol Tartrate 25 Mg Tab  PO   Not Given





  DAILY [facility]  


 


Morphine Sulfate  2 mg  05/23/21 22:33  05/28/21 23:16





  Morphine 2 Mg/1 Ml Inj  IV   2 mg





  Q4H PRN   Administration





  Pain, Moderate (4-6)  


 


Ondansetron HCl  4 mg  05/23/21 22:33 





  Ondansetron 4 Mg/2 Ml Inj  IV  





  Q8H PRN  





  Nausea And Vomiting  


 


Oxycodone/Acetaminophen  1 tab  05/23/21 22:33  05/28/21 14:36





  Oxycodone /Acetaminophen 5-325mg Tab  PO   1 tab





  Q6H PRN   Administration





  Pain, Moderate (4-6)  


 


Pantoprazole Sodium  40 mg  05/24/21 10:00  05/28/21 14:36





  Pantoprazole 40 Mg Tab  PO   40 mg





  DAILY JUDY   Administration


 


Pregabalin  75 mg  05/24/21 10:00  05/28/21 21:00





  Pregabalin 75 Mg Cap  PO   75 mg





  BID JUDY   Administration


 


Sevelamer Carbonate  3,200 mg  05/24/21 08:00  05/29/21 08:42





  Sevelamer Carbonate 800 Mg Tab  PO   3,200 mg





  TIDWM JUDY   Administration


 


Sodium Chloride  10 ml  05/24/21 10:00  05/28/21 21:02





  Sodium Chloride 0.9% 10 Ml Flush Syringe  IV   10 ml





  BID JUDY   Administration


 


Sodium Chloride  10 ml  05/23/21 22:33 





  Sodium Chloride 0.9% 10 Ml Flush Syringe  IV  





  PRN PRN  





  LINE FLUSH